# Patient Record
Sex: FEMALE | Race: ASIAN | NOT HISPANIC OR LATINO | Employment: FULL TIME | URBAN - METROPOLITAN AREA
[De-identification: names, ages, dates, MRNs, and addresses within clinical notes are randomized per-mention and may not be internally consistent; named-entity substitution may affect disease eponyms.]

---

## 2017-06-05 ENCOUNTER — COMMUNICATION - HEALTHEAST (OUTPATIENT)
Dept: FAMILY MEDICINE | Facility: CLINIC | Age: 22
End: 2017-06-05

## 2017-06-07 ENCOUNTER — PRENATAL OFFICE VISIT - HEALTHEAST (OUTPATIENT)
Dept: FAMILY MEDICINE | Facility: CLINIC | Age: 22
End: 2017-06-07

## 2017-06-07 DIAGNOSIS — Z32.01 POSITIVE PREGNANCY TEST: ICD-10-CM

## 2017-06-07 DIAGNOSIS — N92.6 ABNORMAL MENSES: ICD-10-CM

## 2017-06-07 DIAGNOSIS — Z34.00 NORMAL PREGNANCY, FIRST: ICD-10-CM

## 2017-06-07 LAB — HIV 1+2 AB+HIV1 P24 AG SERPL QL IA: NEGATIVE

## 2017-06-07 ASSESSMENT — MIFFLIN-ST. JEOR: SCORE: 1359.11

## 2017-06-08 LAB
HBV SURFACE AG SERPL QL IA: NEGATIVE
SYPHILIS RPR SCREEN - HISTORICAL: NORMAL

## 2017-06-09 ENCOUNTER — HOSPITAL ENCOUNTER (OUTPATIENT)
Dept: ULTRASOUND IMAGING | Facility: HOSPITAL | Age: 22
Discharge: HOME | End: 2017-06-09
Attending: PHYSICIAN ASSISTANT

## 2017-06-09 DIAGNOSIS — Z34.00 NORMAL PREGNANCY, FIRST: ICD-10-CM

## 2017-06-09 DIAGNOSIS — Z32.01 POSITIVE PREGNANCY TEST: ICD-10-CM

## 2017-06-15 ENCOUNTER — COMMUNICATION - HEALTHEAST (OUTPATIENT)
Dept: SCHEDULING | Facility: CLINIC | Age: 22
End: 2017-06-15

## 2017-06-30 ENCOUNTER — PRENATAL OFFICE VISIT - HEALTHEAST (OUTPATIENT)
Dept: FAMILY MEDICINE | Facility: CLINIC | Age: 22
End: 2017-06-30

## 2017-06-30 DIAGNOSIS — Z34.91 ENCOUNTER FOR SUPERVISION OF NORMAL PREGNANCY IN FIRST TRIMESTER: ICD-10-CM

## 2017-06-30 ASSESSMENT — MIFFLIN-ST. JEOR: SCORE: 1363.64

## 2017-07-28 ENCOUNTER — PRENATAL OFFICE VISIT - HEALTHEAST (OUTPATIENT)
Dept: FAMILY MEDICINE | Facility: CLINIC | Age: 22
End: 2017-07-28

## 2017-07-28 DIAGNOSIS — Z34.92 ENCOUNTER FOR SUPERVISION OF NORMAL PREGNANCY IN SECOND TRIMESTER: ICD-10-CM

## 2017-08-29 ENCOUNTER — PRENATAL OFFICE VISIT - HEALTHEAST (OUTPATIENT)
Dept: FAMILY MEDICINE | Facility: CLINIC | Age: 22
End: 2017-08-29

## 2017-08-29 DIAGNOSIS — Z34.92 ENCOUNTER FOR SUPERVISION OF NORMAL PREGNANCY IN SECOND TRIMESTER: ICD-10-CM

## 2017-09-29 ENCOUNTER — RECORDS - HEALTHEAST (OUTPATIENT)
Dept: ADMINISTRATIVE | Facility: OTHER | Age: 22
End: 2017-09-29

## 2017-09-29 ENCOUNTER — PRENATAL OFFICE VISIT - HEALTHEAST (OUTPATIENT)
Dept: FAMILY MEDICINE | Facility: CLINIC | Age: 22
End: 2017-09-29

## 2017-09-29 DIAGNOSIS — Z34.92 SUPERVISION OF NORMAL PREGNANCY IN SECOND TRIMESTER: ICD-10-CM

## 2017-09-29 DIAGNOSIS — Z23 NEED FOR PROPHYLACTIC VACCINATION AND INOCULATION AGAINST INFLUENZA: ICD-10-CM

## 2017-09-29 DIAGNOSIS — N76.0 VAGINITIS: ICD-10-CM

## 2017-09-29 DIAGNOSIS — R45.86 MOOD SWINGS: ICD-10-CM

## 2017-10-09 ENCOUNTER — COMMUNICATION - HEALTHEAST (OUTPATIENT)
Dept: FAMILY MEDICINE | Facility: CLINIC | Age: 22
End: 2017-10-09

## 2017-10-17 ENCOUNTER — HOSPITAL ENCOUNTER (OUTPATIENT)
Dept: ULTRASOUND IMAGING | Facility: HOSPITAL | Age: 22
Discharge: HOME | End: 2017-10-17
Attending: FAMILY MEDICINE

## 2017-10-17 DIAGNOSIS — Z34.92 SUPERVISION OF NORMAL PREGNANCY IN SECOND TRIMESTER: ICD-10-CM

## 2017-10-31 ENCOUNTER — PRENATAL OFFICE VISIT - HEALTHEAST (OUTPATIENT)
Dept: FAMILY MEDICINE | Facility: CLINIC | Age: 22
End: 2017-10-31

## 2017-10-31 DIAGNOSIS — B37.9 YEAST INFECTION: ICD-10-CM

## 2017-10-31 DIAGNOSIS — Z34.92 SECOND TRIMESTER PREGNANCY: ICD-10-CM

## 2017-11-01 ENCOUNTER — COMMUNICATION - HEALTHEAST (OUTPATIENT)
Dept: FAMILY MEDICINE | Facility: CLINIC | Age: 22
End: 2017-11-01

## 2017-11-01 LAB — SYPHILIS RPR SCREEN - HISTORICAL: NORMAL

## 2017-11-03 ENCOUNTER — HOSPITAL ENCOUNTER (OUTPATIENT)
Dept: ULTRASOUND IMAGING | Facility: CLINIC | Age: 22
Discharge: HOME | End: 2017-11-03

## 2017-11-03 DIAGNOSIS — Z34.92 SECOND TRIMESTER PREGNANCY: ICD-10-CM

## 2017-11-06 ENCOUNTER — COMMUNICATION - HEALTHEAST (OUTPATIENT)
Dept: FAMILY MEDICINE | Facility: CLINIC | Age: 22
End: 2017-11-06

## 2017-11-28 ENCOUNTER — COMMUNICATION - HEALTHEAST (OUTPATIENT)
Dept: FAMILY MEDICINE | Facility: CLINIC | Age: 22
End: 2017-11-28

## 2017-11-28 ENCOUNTER — PRENATAL OFFICE VISIT - HEALTHEAST (OUTPATIENT)
Dept: FAMILY MEDICINE | Facility: CLINIC | Age: 22
End: 2017-11-28

## 2017-11-28 DIAGNOSIS — Z34.93 THIRD TRIMESTER PREGNANCY: ICD-10-CM

## 2017-12-18 ENCOUNTER — PRENATAL OFFICE VISIT - HEALTHEAST (OUTPATIENT)
Dept: FAMILY MEDICINE | Facility: CLINIC | Age: 22
End: 2017-12-18

## 2017-12-18 DIAGNOSIS — Z34.93 THIRD TRIMESTER PREGNANCY: ICD-10-CM

## 2018-01-02 ENCOUNTER — PRENATAL OFFICE VISIT - HEALTHEAST (OUTPATIENT)
Dept: FAMILY MEDICINE | Facility: CLINIC | Age: 23
End: 2018-01-02

## 2018-01-02 DIAGNOSIS — Z34.93 THIRD TRIMESTER PREGNANCY: ICD-10-CM

## 2018-01-02 LAB
ALBUMIN UR-MCNC: NEGATIVE MG/DL
GLUCOSE UR STRIP-MCNC: NEGATIVE MG/DL
KETONES UR STRIP-MCNC: NEGATIVE MG/DL

## 2018-01-03 LAB
ALLERGIC TO PENICILLIN: NO
GP B STREP DNA SPEC QL NAA+PROBE: POSITIVE

## 2018-01-04 ENCOUNTER — COMMUNICATION - HEALTHEAST (OUTPATIENT)
Dept: FAMILY MEDICINE | Facility: CLINIC | Age: 23
End: 2018-01-04

## 2018-01-09 ENCOUNTER — PRENATAL OFFICE VISIT - HEALTHEAST (OUTPATIENT)
Dept: FAMILY MEDICINE | Facility: CLINIC | Age: 23
End: 2018-01-09

## 2018-01-09 DIAGNOSIS — Z34.03 ENCOUNTER FOR SUPERVISION OF NORMAL FIRST PREGNANCY IN THIRD TRIMESTER: ICD-10-CM

## 2018-01-15 ENCOUNTER — PRENATAL OFFICE VISIT - HEALTHEAST (OUTPATIENT)
Dept: FAMILY MEDICINE | Facility: CLINIC | Age: 23
End: 2018-01-15

## 2018-01-15 DIAGNOSIS — Z34.93 NORMAL PREGNANCY, THIRD TRIMESTER: ICD-10-CM

## 2018-01-23 ENCOUNTER — PRENATAL OFFICE VISIT - HEALTHEAST (OUTPATIENT)
Dept: FAMILY MEDICINE | Facility: CLINIC | Age: 23
End: 2018-01-23

## 2018-01-23 ENCOUNTER — RECORDS - HEALTHEAST (OUTPATIENT)
Dept: ADMINISTRATIVE | Facility: OTHER | Age: 23
End: 2018-01-23

## 2018-01-23 DIAGNOSIS — Z34.93 THIRD TRIMESTER PREGNANCY: ICD-10-CM

## 2018-01-26 ENCOUNTER — HOSPITAL ENCOUNTER (OUTPATIENT)
Dept: ULTRASOUND IMAGING | Facility: HOSPITAL | Age: 23
Discharge: HOME | End: 2018-01-26
Attending: FAMILY MEDICINE

## 2018-01-29 ENCOUNTER — PRENATAL OFFICE VISIT - HEALTHEAST (OUTPATIENT)
Dept: FAMILY MEDICINE | Facility: CLINIC | Age: 23
End: 2018-01-29

## 2018-01-29 DIAGNOSIS — Z34.93 ENCOUNTER FOR SUPERVISION OF NORMAL PREGNANCY IN THIRD TRIMESTER: ICD-10-CM

## 2018-01-29 DIAGNOSIS — N89.8 VAGINAL DISCHARGE: ICD-10-CM

## 2018-01-29 LAB
ALBUMIN UR-MCNC: NEGATIVE MG/DL
CLUE CELLS: NORMAL
GLUCOSE UR STRIP-MCNC: NEGATIVE MG/DL
KETONES UR STRIP-MCNC: NEGATIVE MG/DL
TRICHOMONAS, WET PREP: NORMAL
YEAST, WET PREP: NORMAL

## 2018-01-30 ENCOUNTER — ANESTHESIA - HEALTHEAST (OUTPATIENT)
Dept: OBGYN | Facility: HOSPITAL | Age: 23
End: 2018-01-30

## 2018-01-30 ENCOUNTER — COMMUNICATION - HEALTHEAST (OUTPATIENT)
Dept: OBGYN | Facility: HOSPITAL | Age: 23
End: 2018-01-30

## 2018-02-01 ENCOUNTER — HOME CARE/HOSPICE - HEALTHEAST (OUTPATIENT)
Dept: HOME HEALTH SERVICES | Facility: HOME HEALTH | Age: 23
End: 2018-02-01

## 2018-02-02 ENCOUNTER — HOME CARE/HOSPICE - HEALTHEAST (OUTPATIENT)
Dept: HOME HEALTH SERVICES | Facility: HOME HEALTH | Age: 23
End: 2018-02-02

## 2018-02-06 ENCOUNTER — COMMUNICATION - HEALTHEAST (OUTPATIENT)
Dept: FAMILY MEDICINE | Facility: CLINIC | Age: 23
End: 2018-02-06

## 2018-02-06 DIAGNOSIS — Z78.9 BREASTFEEDING (INFANT): ICD-10-CM

## 2018-02-06 DIAGNOSIS — N64.4 NIPPLE SORENESS: ICD-10-CM

## 2018-02-19 ENCOUNTER — COMMUNICATION - HEALTHEAST (OUTPATIENT)
Dept: FAMILY MEDICINE | Facility: CLINIC | Age: 23
End: 2018-02-19

## 2018-03-12 ENCOUNTER — OFFICE VISIT - HEALTHEAST (OUTPATIENT)
Dept: FAMILY MEDICINE | Facility: CLINIC | Age: 23
End: 2018-03-12

## 2018-03-12 DIAGNOSIS — Z30.017 NEXPLANON INSERTION: ICD-10-CM

## 2018-03-12 LAB
HCG UR QL: NEGATIVE
SP GR UR STRIP: <=1.005 (ref 1–1.03)

## 2018-03-12 ASSESSMENT — MIFFLIN-ST. JEOR: SCORE: 1390.86

## 2018-04-11 ENCOUNTER — COMMUNICATION - HEALTHEAST (OUTPATIENT)
Dept: OBGYN | Facility: HOSPITAL | Age: 23
End: 2018-04-11

## 2019-03-18 ENCOUNTER — TELEPHONE (OUTPATIENT)
Dept: OTHER | Facility: CLINIC | Age: 24
End: 2019-03-18

## 2019-10-04 ENCOUNTER — OFFICE VISIT - HEALTHEAST (OUTPATIENT)
Dept: FAMILY MEDICINE | Facility: CLINIC | Age: 24
End: 2019-10-04

## 2019-10-04 DIAGNOSIS — Z71.85 IMMUNIZATION COUNSELING: ICD-10-CM

## 2019-10-04 DIAGNOSIS — H00.019 HORDEOLUM EXTERNUM, UNSPECIFIED LATERALITY: ICD-10-CM

## 2019-10-04 ASSESSMENT — MIFFLIN-ST. JEOR: SCORE: 1399.93

## 2021-03-17 ENCOUNTER — OFFICE VISIT - HEALTHEAST (OUTPATIENT)
Dept: FAMILY MEDICINE | Facility: CLINIC | Age: 26
End: 2021-03-17

## 2021-03-17 DIAGNOSIS — Z11.59 ENCOUNTER FOR HEPATITIS C SCREENING TEST FOR LOW RISK PATIENT: ICD-10-CM

## 2021-03-17 DIAGNOSIS — Z30.46 ENCOUNTER FOR NEXPLANON REMOVAL: ICD-10-CM

## 2021-03-17 DIAGNOSIS — Z00.00 ANNUAL PHYSICAL EXAM: ICD-10-CM

## 2021-03-17 LAB
CHOLEST SERPL-MCNC: 206 MG/DL
FASTING STATUS PATIENT QL REPORTED: YES
FASTING STATUS PATIENT QL REPORTED: YES
GLUCOSE BLD-MCNC: 91 MG/DL (ref 70–99)
HDLC SERPL-MCNC: 60 MG/DL
LDLC SERPL CALC-MCNC: 138 MG/DL
TRIGL SERPL-MCNC: 41 MG/DL

## 2021-03-17 ASSESSMENT — MIFFLIN-ST. JEOR: SCORE: 1493.71

## 2021-03-18 LAB — HCV AB SERPL QL IA: NEGATIVE

## 2021-05-31 VITALS — BODY MASS INDEX: 24.24 KG/M2 | WEIGHT: 142 LBS | HEIGHT: 64 IN

## 2021-05-31 VITALS — BODY MASS INDEX: 28.49 KG/M2 | WEIGHT: 166 LBS

## 2021-05-31 VITALS — BODY MASS INDEX: 28.44 KG/M2 | WEIGHT: 165.7 LBS

## 2021-05-31 VITALS — WEIGHT: 166.4 LBS | BODY MASS INDEX: 28.56 KG/M2

## 2021-05-31 VITALS — WEIGHT: 148 LBS | BODY MASS INDEX: 25.4 KG/M2

## 2021-05-31 VITALS — WEIGHT: 167.44 LBS | BODY MASS INDEX: 28.74 KG/M2

## 2021-05-31 VITALS — BODY MASS INDEX: 24.41 KG/M2 | WEIGHT: 143 LBS | HEIGHT: 64 IN

## 2021-05-31 VITALS — WEIGHT: 137.5 LBS | BODY MASS INDEX: 23.6 KG/M2

## 2021-05-31 VITALS — BODY MASS INDEX: 28.13 KG/M2 | WEIGHT: 163.9 LBS

## 2021-05-31 VITALS — BODY MASS INDEX: 24.03 KG/M2 | WEIGHT: 140 LBS

## 2021-05-31 VITALS — WEIGHT: 157 LBS | BODY MASS INDEX: 26.95 KG/M2

## 2021-05-31 VITALS — WEIGHT: 154.2 LBS | BODY MASS INDEX: 26.47 KG/M2

## 2021-06-01 VITALS — HEIGHT: 64 IN | WEIGHT: 149 LBS | BODY MASS INDEX: 25.44 KG/M2

## 2021-06-01 VITALS — BODY MASS INDEX: 28.87 KG/M2 | WEIGHT: 168.19 LBS

## 2021-06-02 NOTE — PROGRESS NOTES
"Subjective: Patient comes in for evaluation of right upper lid.  Patient has had some styes somewhat recurrent.    We discussed treatment options she is not really been doing anything    Overnight use some erythromycin ointment is warm packs.  If not improved see ophthalmology    She also needed a flu shot today    The inflammation is come down but she said the recurrence in the right upper lid.        No additional concerns or issues.    Tobacco status: She  reports that she has never smoked. She has never used smokeless tobacco.    Patient Active Problem List    Diagnosis Date Noted     Pregnant 2018      (normal spontaneous vaginal delivery)      Yeast infection 10/31/2017     Rubella non-immune status, antepartum 2017     Normal pregnancy, first 2017       Current Outpatient Medications   Medication Sig Dispense Refill     docusate sodium (COLACE) 100 MG capsule Take 1 capsule (100 mg total) by mouth daily. 30 capsule 0     erythromycin ophthalmic ointment Apply two times a day to affected eye 3.5 g 0     ibuprofen (ADVIL,MOTRIN) 800 MG tablet Take 1 tablet (800 mg total) by mouth every 8 (eight) hours. 60 tablet 0     nipple ointment all purpose - [facility] Apply topically as needed for irritation. Apply to nipples sparingly after each feeding. Do not wash or wipe off. 30.6 g 0     No current facility-administered medications for this visit.        ROS:   10 point review of systems positive as outlined above otherwise negative    Objective:    BP 90/52 (Patient Site: Right Arm, Patient Position: Sitting, Cuff Size: Adult Regular)   Pulse 72   Resp 16   Ht 5' 4\" (1.626 m)   Wt 151 lb (68.5 kg)   LMP 2019 (Within Months)   BMI 25.92 kg/m    Body mass index is 25.92 kg/m .      General appearance no acute distress    Pupils react normally extract movements full right upper lid with some mild hordeolum change    No drainage    Oropharynx is clear    Lungs are clear no " rales or rhonchi heart was regular S1-S2        Assessment:  1. Hordeolum externum, unspecified laterality  erythromycin ophthalmic ointment   2. Immunization counseling  Influenza, Seasonal Quad, PF =/> 6months     With warm packs and erythromycin ointment once a day.    Flu shot    If not improved see ophthalmology    Plan: Above    This transcription uses voice recognition software, which may contain typographical errors.

## 2021-06-03 VITALS
BODY MASS INDEX: 25.78 KG/M2 | WEIGHT: 151 LBS | SYSTOLIC BLOOD PRESSURE: 90 MMHG | HEIGHT: 64 IN | HEART RATE: 72 BPM | RESPIRATION RATE: 16 BRPM | DIASTOLIC BLOOD PRESSURE: 52 MMHG

## 2021-06-05 VITALS
HEIGHT: 65 IN | BODY MASS INDEX: 28.16 KG/M2 | DIASTOLIC BLOOD PRESSURE: 61 MMHG | RESPIRATION RATE: 14 BRPM | WEIGHT: 169 LBS | HEART RATE: 68 BPM | SYSTOLIC BLOOD PRESSURE: 101 MMHG | TEMPERATURE: 97.7 F

## 2021-06-11 NOTE — PROGRESS NOTES
Chief Complaint:  Chief Complaint   Patient presents with     Pregnancy Intake     #1, LMP: Mid Apri 2017     HPI:   Lizbeth Ramos is a 22 y.o. female is here for pregnancy intake.  She is .  Patient's last menstrual period was 04/15/2017 (approximate).  Positive home pregnancy test 6/3/17.  Mild allergy symptoms.  Her sister has some hearing loss since birth, has a hearing aid.  Her 's sister has Cerebral Palsy and some Mental Retardation.    Past medical history: reviewed and updated.  No past medical history on file.  Past Surgical History:   Procedure Laterality Date     NO PAST SURGERIES         Current Outpatient Prescriptions:      acetaminophen (TYLENOL) 500 MG tablet, Take 500 mg by mouth every 6 (six) hours as needed for pain., Disp: , Rfl:      loratadine (CLARITIN) 10 mg tablet, Take 1 tablet (10 mg total) by mouth daily., Disp: 30 tablet, Rfl: 2     prenatal vit-iron fum-folic ac (PRENATAL VITAMIN) 27 mg iron- 0.8 mg Tab, Take 1 tablet by mouth once daily., Disp: 100 tablet, Rfl: 3    Social:  Social History   Substance Use Topics     Smoking status: Never Smoker     Smokeless tobacco: Never Used     Alcohol use Yes      Comment: occasional       OBJECTIVE:  No Known Allergies  Vitals:    17 1324   BP: 100/64   Pulse: 76   Resp: 16     Body mass index is 24.37 kg/(m^2).    Vital signs stable as recorded above  General: Patient is alert and oriented x 3, in no apparent distress  Physical Exam deferred to patient's First OB Visit.    Results:  Results for orders placed or performed in visit on 17   Culture, Urine   Result Value Ref Range    Culture No Growth    Pregnancy, Urine   Result Value Ref Range    Pregnancy Test, Urine Positive (!) Negative   ABO/RH Typing (OP order)   Result Value Ref Range    HML ABO/Rh Typing B POS     HML ABO/Rh Repeat Typing B POS    Hepatitis B Surface antigen (HBsAG)   Result Value Ref Range    Hepatitis B Surface Ag Negative Negative   HIV  Antigen/Antibody Screening Cascade   Result Value Ref Range    HIV Antigen / Antibody Negative Negative   HML Antibody Screen   Result Value Ref Range    HML Antibody Screen Negative Negative   RPR   Result Value Ref Range    Syphilis Screen Cascade Non-Reactive Non-Reactive   Rubella Immune Status (IgG)   Result Value Ref Range    Rubella IgG Non - Immune (!) Immune   Lead, Blood   Result Value Ref Range    Lead  <5.0 ug/dL    Collection Method Venous    Drugs of Abuse 1,Urine   Result Value Ref Range    Amphetamines Screen Negative Screen Negative    Benzodiazepines Screen Negative Screen Negative    Opiates Screen Negative Screen Negative    Phencyclidine Screen Negative Screen Negative    THC Screen Negative Screen Negative    Barbiturates Screen Negative Screen Negative    Cocaine Metabolite Screen Negative Screen Negative    Oxycodone Screen Negative Screen Negative    Creatinine, Urine 35.7 mg/dL   Urinalysis, OB Screen   Result Value Ref Range    Glucose, UA Negative Negative    Ketones, UA Negative Negative    Protein, UA Negative Negative mg/dL   HM1 (CBC with Diff)   Result Value Ref Range    WBC 9.2 4.0 - 11.0 thou/uL    RBC 4.12 3.80 - 5.40 mill/uL    Hemoglobin 12.9 12.0 - 16.0 g/dL    Hematocrit 39.3 35.0 - 47.0 %    MCV 95 80 - 100 fL    MCH 31.3 27.0 - 34.0 pg    MCHC 32.8 32.0 - 36.0 g/dL    RDW 12.1 11.0 - 14.5 %    Platelets 170 140 - 440 thou/uL    MPV 11.6 8.5 - 12.5 fL    Neutrophils % 72 (H) 50 - 70 %    Lymphocytes % 17 (L) 20 - 40 %    Monocytes % 10 2 - 10 %    Eosinophils % 1 0 - 6 %    Basophils % 1 0 - 2 %    Neutrophils Absolute 6.6 2.0 - 7.7 thou/uL    Lymphocytes Absolute 1.5 0.8 - 4.4 thou/uL    Monocytes Absolute 0.9 0.0 - 0.9 thou/uL    Eosinophils Absolute 0.1 0.0 - 0.4 thou/uL    Basophils Absolute 0.1 0.0 - 0.2 thou/uL     Other screening pregnancy lab work is ordered and pending.    Patient scored a 7/30 on [place]  Depression Screen.    Assessment and Plan:  1.  Pregnancy Intake Appointment.  Lizbeth Ramos is 22 y.o. and .  Patient's last menstrual period was 04/15/2017 (approximate).  Estimated Date of Delivery: 18  She will be seeing Dr. Palumbo for OB care.  She will be seeing Dr. Robb until Dr. Palumbo starts in September.  Screening pregnancy lab work was drawn.  Prenatal vitamins prescribed.  Problem list and current medications reviewed and updated.  Dating ultrasound ordered.  Prenatal info packet given.  First trimester screening and genetic counseling were reviewed.  She will contact us if interested.    Follow up in 4 weeks.  Please see OB Episode for complete details.  Visit was approximately 35 minutes, greater than 50% of time spent in face-to-face counseling and coordination of care.    This dictation uses voice recognition software, which may contain typographical errors.

## 2021-06-11 NOTE — PROGRESS NOTES
First OB.   Feeling well though somewhat tired.   She wanted to check her hemoglobin, this was done 6/7/17 and normal.   Last TSH was normal.   Discussed vitamin D supplementation, she will start.   No pelvic pain, bleeding, dysuria.   Chlamydia, gonorrhea collected today.   Plans to deliver at [facility].   Plans to work until delivery.

## 2021-06-12 NOTE — PROGRESS NOTES
Feeling well.   No bleeding. No lof. No dysuria. No pelvic pain.   Discussed quad screen, will consider for next visit.

## 2021-06-12 NOTE — PROGRESS NOTES
Feeling well though had some loose stool over the weekend, attributed to viral gastroenteritis. If feeling better now.   No pelvic pain or bleeding. No FM yet.   Quad screen today.   Will schedule with Dr. Palumbo for next OB visit and remaining care.

## 2021-06-13 NOTE — PROGRESS NOTES
Please call patient and inform:  Glucose tolerance test was negative.  No signs of gestational diabetes.  All other labs normal except for some mild anemia.  Hemoglobin 11.4, no additional therapy needed just continue your prenatal vitamin and increase iron rich foods.

## 2021-06-13 NOTE — PROGRESS NOTES
Does not feel much FM yet.   No bleeding, lof, contractions, pelvic pain, but notes vaginal itching for the last two weeks. It is somewhat better. Has used vagisil, a cream with some relief.   She notes worsening mood swings. She wonders if this is due to pregnancy. She reports some changes in life, for example she moved in with FOB in August. That has been some adjustment. She reports weekly 'mood swings' that seem to be worsening.   Denies depression, anxiety, but some similar changes when in school. She denies suicidal or homicidal ideations. She agrees to seek counseling. This was arranged.   She would like to check for a yeast infection. Has had this before.   Wet prep collected. : Some erythema over vaginal mucosa. Mild white discharge. Wet prep pending. Glucose noted in urine.   Fetal survey scheduled.   Influenza vaccine today.   Will see Dr. Palumbo starting next visit in four weeks.

## 2021-06-13 NOTE — PROGRESS NOTES
No ctx, lof, bleeding or pelvic pain.  No HA or vision changes.  Still having some irritation, itching and white vaginal discharge.  Did take the Diflucan ×1 and it did not help.  Discussed different options and she would like to try the cream.  Patient is overall doing well except some lower back pain.  has to find different positions during the night to sleep.  Good FM : yes    Labs/imaging reviewed: Needs repeat ultrasound as spine not fully evaluated.    Plan today:   Clotriamzolel cream 1% ×7 nights  Tdap today  Follow-up OB limited ultrasound for spine  1 hour GTT and 28 week labs  Discussed prenatal yoga.  Follow-up in 4 weeks    Gloria Palumbo

## 2021-06-14 NOTE — PROGRESS NOTES
No ctx, lof, bleeding, or discharge.  No HA or vision changes.  [place] contractions.  Good FM: Yes  Patient states she is feeling well and has no concerns.    Plan today:   Reviewed postpartum birth control options and handout given.  Patient is thinking she would like Nexplanon placed at the 6 week postpartum visit.  She is planning to breast-feed and we discussed lactation consultant services.  She will plan on bringing her baby here to be seen.  Discussed GBS swab at next visit and expectant management.  Follow-up 2 weeks    Gloria Palumbo

## 2021-06-14 NOTE — PROGRESS NOTES
No ctx, lof, bleeding, or discharge.  No HA or vision changes.  Good FM: Yes!  She is wondering what contractions feel like.  She has been feeling a little bit of pelvic tightness that occurs for short period time a few times a day.  She is wondering if this is contractions.  She was also wondering about the follow-up ultrasound.  She is also wondering about a .    Labs/imaging reviewed: Follow-up ultrasound of the spine was normal.    Plan today:   Reviewed signs and symptoms of labor.  Reviewed pain control options in labor.  Reviewed going to [facility] for delivery.  Has been there before as her mother has some younger children from her second marriage delivered at [facility].  Follow-up in 3 weeks.  Will talk to  about  referrals.    Gloria Palumbo

## 2021-06-15 NOTE — PROGRESS NOTES
No ctx, lof, bleeding, or discharge.  No HA or vision changes.  Good FM : yes.   Having ginny hogan and feeling more pressure.       Plan today:   SVE ft/50/-2/mid/med. A little change in effacement.   Growth has been stable at 37cm for 3 weeks.  Suspect baby is coming down the pelvis but does not feel like that much on exam.  Was 25% EFW and 20 week anatomy ultrasound.  Previously growing above dates and now falling below.  Will obtain growth ultrasound to evaluate today.  Discussed my vacation days this weekend and on call back up.  Follow-up in 1 week.      Gloria Palumbo

## 2021-06-15 NOTE — PROGRESS NOTES
No ctx, lof, bleeding, or discharge.  No HA or vision changes.  Good FM : yes  No questions or concerns today. Nothing new.     Plan today:   Discussed rubella nonimmune status and need for MMR after delivery  Discussed expectant stay at the hospital and lactation consultants  Answered all questions to patient satisfaction.  Follow-up in 1 week.    Gloria Palumbo

## 2021-06-15 NOTE — PROGRESS NOTES
Please call patient and inform:  Positive for GBS. Will need antibiotics during labor. Can discuss again in more detail at next visit.

## 2021-06-15 NOTE — PROGRESS NOTES
Please call patient and inform:  No signs of yeast or bacterial vaginosis on swab we obtained.  I am suspecting discharge is normal discharge of pregnancy or possibly part of your mucus plug especially with the amount of change you made on your cervical exam!

## 2021-06-15 NOTE — PROGRESS NOTES
No ctx, lof, bleeding, or discharge.  No HA or vision changes.  Good FM : yes!  Has some sensitivity of her skin in the upper part of where her uterus is. No itching. A little pain/sensitive. Has been applying lotion and this is helping.  Exam: no stretch marks noted.     Labs/imaging reviewed: GBS +    Plan today:   25 lb weight gain  GBS +, discussed expectant management with antibiotics in labor.  Measuring well for dates.  Baby feels head down.  SVE fingertip/20/-3/posterior  Questions answered to patient and boyfriend satisfaction.  Discussed signs and symptoms of labor.  Follow-up in 1 week.    Gloria Palumbo

## 2021-06-15 NOTE — PROGRESS NOTES
Patient endorses a whitish to yellow tinge vaginal discharge off and on for the last few weeks now.  Forgot to mention something.  Not sticky.  Has a bad odor.  Not irritating or itchy.  Denies any gush of fluid, leaking or vaginal discharge.  Endorses some more [place] contractions that are lasting longer.  Also endorses some intermittent headaches and some vision changes wondering if she needs a new eyeglass prescription.  Good FM: Yes    Labs/imaging reviewed: Recent ultrasound showing appropriate growth following along growth curve within 2 standard deviations.  29% EFW.    Plan today:  Speculum  exam performed: Noted white creamy discharge.  Wet mount obtained  SVE: 2/70/-2.  Decent amount change since I last saw her.  Membrane stripped today.  Reviewed s/s of preeclampsia and when to call or return.   Answered all questions to patient satisfaction.  Follow-up in 1 week.    Gloria Palumbo

## 2021-06-15 NOTE — PROGRESS NOTES
No ctx, lof, bleeding, or discharge.  No HA or vision changes. Felt a little dehydrated this week. Her and BF Jamal have been sick with colds.   Good FM : yes.   No questions or concerns today.     Labs/imaging reviewed: due for GBS today    Plan today:   GBS swab obtained.   Measuring at 35-36cm today. A little more than last time. Will monitor closely- appropriate for dates still. Gained 2lbs since last visit.   F/u in 1 week.     Gloria Palumbo

## 2021-06-16 PROBLEM — B37.9 YEAST INFECTION: Status: ACTIVE | Noted: 2017-10-31

## 2021-06-16 PROBLEM — Z28.39 RUBELLA NON-IMMUNE STATUS, ANTEPARTUM: Status: ACTIVE | Noted: 2017-06-09

## 2021-06-16 PROBLEM — Z34.90 PREGNANT: Status: ACTIVE | Noted: 2018-01-30

## 2021-06-16 PROBLEM — O09.899 RUBELLA NON-IMMUNE STATUS, ANTEPARTUM: Status: ACTIVE | Noted: 2017-06-09

## 2021-06-16 PROBLEM — Z34.00 NORMAL PREGNANCY, FIRST: Status: ACTIVE | Noted: 2017-06-07

## 2021-06-16 NOTE — PROGRESS NOTES
ANNUAL       Chief Complaint   Patient presents with     Annual Exam     nexplanon removal       HISTORY OF PRESENT ILLNESS:  Pt is a 26 y.o.     alert female who presents today for an annual exam.    Concerns today: Desires nexplanon removal. Biometric screening for work    Contraception: things happening to my body. Over the 3 years. When I exercise  my body gets really warm. Break out in rashes and get itchy. Go outside if chilly. Hives. And wasn't itchy.   A day or two without shower and scalp itchy  1st year- some emotional issues after having Eleby - readjust?  Menses q 3 months. When it does flow. really high emotionally Vs monthly cycle.   They think they would like to start trying to get pregnant later this year so have decided to take nexplanon out today and start to track cycles. See if symptoms above improve after nexplanon removal. Is this the cause or something else?- she will let me know if symptoms do not improve and they are bothering her.    Std screening: declines    Healthy Habits:   Dental Visits Regularly: yes- last 1 year ago  Seat Belt: yes  Sexually active: yes    No LMP recorded. Patient has had an implant.    No Known Allergies    No current outpatient medications on file prior to visit.     No current facility-administered medications on file prior to visit.        No past medical history on file.    Past Surgical History:   Procedure Laterality Date     NO PAST SURGERIES         Social History     Socioeconomic History     Marital status:      Spouse name: Not on file     Number of children: Not on file     Years of education: Not on file     Highest education level: Not on file   Occupational History     Occupation: student     Comment: masters in counseling     Occupation: career center work   Social Needs     Financial resource strain: Not on file     Food insecurity     Worry: Not on file     Inability: Not on file     Transportation needs     Medical: Not on file      Non-medical: Not on file   Tobacco Use     Smoking status: Never Smoker     Smokeless tobacco: Never Used   Substance and Sexual Activity     Alcohol use: No     Drug use: No     Sexual activity: Yes     Partners: Male     Birth control/protection: None   Lifestyle     Physical activity     Days per week: Not on file     Minutes per session: Not on file     Stress: Not on file   Relationships     Social connections     Talks on phone: Not on file     Gets together: Not on file     Attends Denominational service: Not on file     Active member of club or organization: Not on file     Attends meetings of clubs or organizations: Not on file     Relationship status: Not on file     Intimate partner violence     Fear of current or ex partner: Not on file     Emotionally abused: Not on file     Physically abused: Not on file     Forced sexual activity: Not on file   Other Topics Concern     Not on file   Social History Narrative     Not on file       Family History   Problem Relation Age of Onset     Diabetes type II Mother      Stroke Father      Hearing loss Sister         from birth, has hearing aid     No Medical Problems Brother        OB History        1    Para   1    Term   1       0    AB   0    Living   1       SAB   0    TAB   0    Ectopic   0    Multiple   0    Live Births   1                 GYNECOLOGIC HISTORY:  Currently menses q 3 months and normal flow with nexplanon Lizbeth BRYAN Phillipse has no history of STDs.  Lizbeth BRYAN Phillipse has no history of abnormal Pap smears.   Last pap result was normal 2016.    REVIEW OF SYSTEMS:    Constitutional:  Denies Headache.  No weight changes.  No fevers or chills.    HEENT:  Denies vision changes or hearing changes. No sinus problems.  Breasts:  Denies breast masses, pain or nipple discharge.    Respiratory:  No breathing issues, cough or shortness of breath  Cardiovascular:  Denies chest pain, syncope or palpitations.    GI:  Denies nausea, vomiting, diarrhea, or  "constipation  Endocrine:  Denies hot flashes, night sweats, heat or cold intolerance.  Hematologic:  Denies easy bruising or bleeding disorders.  Allergies/Immunologic:  Denies seasonal allergies or any history of immunologic disorders  Neurologic:  Normal sensation and motor control.  No history of seizures or syncope.  Musculoskeletal:  Denies joint pain, swelling, or erythema  Skin:  + see HPI  Psychiatric:  Denies anxiety, depression, memory deficits, and appetite or sleep changes. Mood changes with menses    PHYSICAL EXAM  /61 (Patient Site: Left Arm, Patient Position: Sitting, Cuff Size: Adult Regular)   Pulse 68   Temp 97.7  F (36.5  C) (Temporal)   Resp 14   Ht 5' 4.76\" (1.645 m)   Wt 169 lb (76.7 kg)   BMI 28.33 kg/m    GENERAL APPEARANCE:  The patient is a pleasant, normal appearing female with normal affect and in no distress.  HEENT: Normocephalic atraumatic.  PERRL, ocular muscles intact.  No conjunctivitis or icterus noticed.  TMs clear with good cone of light reflex.  Oropharynx clear and moist mucous membranes.  NECK: supple.  No cervical lymphadenopathy.  No thyromegaly, no nodules palpated, trachea midline.  LUNGS: Clear bilaterally with normal respiratory effort  HEART:   Regular rate and rhythm.  No murmurs noted.  Pulses are full and symmetrical.  BREASTS: Breast exam performed seated and supine.  No masses, non-tender, no nipple discharge or lymphadenopathy.  ABDOMEN: Soft, non-tender, non-distended.  No hepatosplenomegaly.  Normal bowel sounds.  No umbilical or inguinal hernias.  SKIN:  Warm and dry to touch.  No lesions or rashes noted.    PSYCHIATRIC/NEUROLOGIC:  Appropriate mood and affect, normal recall, alert and oriented x 3  EXTREMITIES:  Warm and well perfused.  No edema noted.  Muscle strength and sensation are normal bilaterally 5/5 in both upper and lower extremities.   :  Vulva: Inspection of her external genitalia reveals normal mons pubis, labia minora and labia " majora.  Normal appearing clitoris, urethral meatus and Buena's glands.    Bladder:  No evidence of urethral or bladder tenderness.    Vagina:  Speculum exam reveals pink and moist vaginal mucosa.  Bartholin gland is normal to palpation.  Cervix:  Cervix is normal in appearance with no lesions.  There is no cervical motion tenderness.  Uterus:  Uterus is normal size, mobile and non-tender.  No adnexal masses are palpated.  Adnexa are non-tender to palpation  Perineum:  Perineum appears normal.  Anus:  Normal with no apparent lesions.         PHQ9:   Little interest or pleasure in doing things: Not at all  Feeling down, depressed, or hopeless: Not at all      Lizbeth was seen today for annual exam.    Diagnoses and all orders for this visit:    Annual physical exam:   Self breast exam risks/benefits reviewed  Safe sex practices reviewed with patient  Contraception reviewed and desires pregnancy later this year. nexplanon out today and will use condoms until ready to conceive   HPV testing dicussed and new Pap smear recommendations reviewed with patient  Biometric screening for work- labs per below  -     Gynecologic Cytology (PAP Smear)  -     Lipid Cascade FASTING  -     Glucose    Encounter for hepatitis C screening test for low risk patient  -     Hepatitis C Antibody (Anti-HCV)    Encounter for Nexplanon removal:   Procedure:  Verbal and written consent was obtained.     Left upper inner arm was adequately anesthetized with 1.5 cc of lidocaine with Epi.  Then, using sterile technique, 5 mm incision was made with an 11 blade.  Nexplanon was palpated subcutaneously and removed with a hemostat clamp.  Incision site was closed with steri-strips and wrapped with a pressure bandage.  Patient was neurovascularly intact after exam.  Proper wound aftercare was dicussed with patient.      Gloria Palumbo

## 2021-06-16 NOTE — PROGRESS NOTES
"Chief Complaint:  Chief Complaint   Patient presents with     Contraception     Postpartum Care       Postpartum Visit  Patient is here for a postpartum visit. She is 6 weeks postpartum following a spontaneous vaginal delivery. I have fully reviewed the prenatal and intrapartum course. The delivery was at 39wk gestational weeks. Outcome: spontaneous vaginal delivery. Anesthesia: none.   Gender female.     Postpartum course has been uncomplicated.  Baby's course has been uncomplicated. Baby is feeding by both breast and bottle - She is only able to produce 3oz a day so is supplementing, may stop now that she is back to work, difficult to keep up with. Bleeding staining now.  Postpartum bleeding for 4.5 weeks.  Bowel function is normal. Bladder function is normal. Patient is sexually active.  Next baby not for awhile.   Contraception method considering is Nexplanon and she would like this placed today.. Postpartum depression screening: negative. WIC :yes.  Exercise yes.  Total Weight Gain during pregnancy 25lb. and Weight loss so far 19lb!.  Return to work/school:returned last week.    Following concerns include:would like nexplanon today. Wondering how long she should have spotting for.    ROS:  10 point review of symptoms all negative except as outlined in the HPI above.    Med list, active problem list, PMH, Surgical Hx, Family Hx reviewed and updated as part of this encounter    Physical Exam:  BP 92/62  Pulse 91  Temp 97.5  F (36.4  C) (Oral)   Resp 20  Ht 5' 4\" (1.626 m)  Wt 149 lb (67.6 kg)  LMP 04/15/2017 (Approximate) Comment: Mid April 2017  SpO2 98%  BMI 25.58 kg/m2 Body mass index is 25.58 kg/(m^2).  Vital signs reviewed    Wt Readings from Last 3 Encounters:   03/12/18 149 lb (67.6 kg)   01/30/18 168 lb (76.2 kg)   01/29/18 168 lb 3 oz (76.3 kg)       EPDS Score: 5    Physical Exam:  All normal as below except abnormalities include: none  General is a  22 y.o. female sitting comfortably in no " apparent distress.   HEENT:  TM are clear bilaterally.  Eye, nasal, oral exams within normal   Neck: Supple without lymphadenopathy or thyromegally  CV: Regular rate and rhythm S1S2 without rubs, murmurs or gallops,   Lungs: Clear to auscultation bilaterally  Abd:  +BS, soft NT/ND,  No masses or organomegally,   Extremities: Warm, No Edema, 2+ Pedal and radial pulses bilaterally  Skin: No lesions or rashes noted  Neuro: Able to ambulate around the exam room with equal movement, strength and normal coordination of the upper and lower extremeties symmetrically  Pelvic:Normally developed genitalia with no external lesions or eruptions. Vagina and cervix show no lesions, inflammation, discharge or tenderness. No cystocele, No rectocele. Uterus :normal size.  No adnexal mass or tenderness.      Results for orders placed or performed in visit on 18   Pregnancy, Urine   Result Value Ref Range    Pregnancy Test, Urine Negative Negative    Specific Gravity, UA <=1.005 1.001 - 1.030     Procedure Note:  Pt is a  22 y.o.  alert female who presents for Nexplanon insertion today. Reviewed with patient all contraceptive options including:  OCP, Ortho Evra, Nuvaring, Depo Provera, IUD-both Mirena and ParaGard, nexplanon, condoms and diaphragm.  After hearing all of her options, the patient chose Nexplanon.  We discussed risks and benefits of Implanon  including:   the possible side effect of metrorrhagia with irregular spotting or bleeding within the first 3-6 months after insertion as well as the 20% risk of amenorrhea.  Other minor side effects were discussed including:  headache (16 percent), weight gain (12 percent), acne (12 percent), breast tenderness (10 percent), emotional lability (6 percent) and abdominal pain (5 percent), pain at insertion site.  Informed consent was obtained.  Pregnancy test was negative.      PROCEDURE:   A point approximately 8 cm from the medial epicondyle on the upper inner left arm  was identified and cleaned with alcohol. This was injected with 2.5cc of 1% Lidocaine with epi along the planned insertion canal. The site was then prepped with Betadyne. The Nexplanon insertion needle was removed from the sterile pack.  The Nexplanon was then inserted just underneath the surface of the skin in the recommended fashion.  Following removal of the insertion needle, the Nexplanon implant was palpated by both the patient and myself.   The insertion site was then covered with an adhesive bandage and then covered with a pressure bandage.  There were no complications and the patient tolerated the procedure well.        Assessment/Plan:  Discussed return to work/school plans.  Postpartum depression assessment and coping techniques. Fatigue and sleep strategies.  Breastfeeding and pumping as needed. Body changes and exercise/weight loss techniques.  Timing of next pregnancy and birth control prescribed as below.  Next pap smear due 2019.      1. Contraception  - Pregnancy, Urine: Negative    2. Nexplanon insertion  The paperwork was filled out and the user care was given to the patient.    The patient was advised to call if she experienced any significant pain, redness or swelling at the incision site or over the alison.    Follow up prn Nexplanon check.  Patient to leave pressure dressing in place for 24h.  Patient to leave adhesive bandage in place for 3 days.  Bleeding precautions given, patient is to call for any heavy bleeding > 1 pad per hour, severe pain or fevers.  - etonogestrel (NEXPLANON) 68 mg Impl implant; 1 each by Subdermal route once for 1 dose.  Dispense: 1 each; Refill: 0  - lidocaine 1%-EPINEPHrine 1:100,000 1 %-1:100,000 injection 2.5 mL (XYLOCAINE W/EPI); 2.5 mL by Other route once.      Gloria Palumbo

## 2021-06-20 ENCOUNTER — HEALTH MAINTENANCE LETTER (OUTPATIENT)
Age: 26
End: 2021-06-20

## 2021-07-04 NOTE — ADDENDUM NOTE
Addendum Note by Hina Escalera MA at 3/17/2021 10:00 AM     Author: Hina Escalera MA Service: -- Author Type: Medical Assistant    Filed: 3/18/2021  8:25 AM Encounter Date: 3/17/2021 Status: Signed    : Hina Escalera MA (Medical Assistant)    Addended by: HINA ESCALERA on: 3/18/2021 08:25 AM        Modules accepted: Orders

## 2021-07-04 NOTE — ADDENDUM NOTE
Addendum Note by Gloria Palumbo DO at 3/17/2021 10:00 AM     Author: Gloria Palumbo DO Service: -- Author Type: Physician    Filed: 3/18/2021  9:30 AM Encounter Date: 3/17/2021 Status: Signed    : Gloria Palumbo DO (Physician)    Addended by: GLORIA PALUMBO on: 3/18/2021 09:30 AM        Modules accepted: Orders

## 2021-10-10 ENCOUNTER — HEALTH MAINTENANCE LETTER (OUTPATIENT)
Age: 26
End: 2021-10-10

## 2022-05-17 ENCOUNTER — OFFICE VISIT (OUTPATIENT)
Dept: FAMILY MEDICINE | Facility: CLINIC | Age: 27
End: 2022-05-17
Payer: COMMERCIAL

## 2022-05-17 VITALS
WEIGHT: 168 LBS | RESPIRATION RATE: 12 BRPM | SYSTOLIC BLOOD PRESSURE: 98 MMHG | BODY MASS INDEX: 28.16 KG/M2 | DIASTOLIC BLOOD PRESSURE: 50 MMHG | HEART RATE: 80 BPM

## 2022-05-17 DIAGNOSIS — Z3A.12 12 WEEKS GESTATION OF PREGNANCY: Primary | ICD-10-CM

## 2022-05-17 DIAGNOSIS — N92.6 IRREGULAR MENSES: ICD-10-CM

## 2022-05-17 PROBLEM — B37.9 YEAST INFECTION: Status: RESOLVED | Noted: 2017-10-31 | Resolved: 2022-05-17

## 2022-05-17 PROBLEM — Z34.00 NORMAL PREGNANCY, FIRST: Status: RESOLVED | Noted: 2017-06-07 | Resolved: 2022-05-17

## 2022-05-17 PROBLEM — Z34.90 PREGNANT: Status: RESOLVED | Noted: 2018-01-30 | Resolved: 2022-05-17

## 2022-05-17 LAB
ABO/RH(D): NORMAL
ALBUMIN UR-MCNC: NEGATIVE MG/DL
ANTIBODY SCREEN: NEGATIVE
BASOPHILS # BLD AUTO: 0 10E3/UL (ref 0–0.2)
BASOPHILS NFR BLD AUTO: 0 %
EOSINOPHIL # BLD AUTO: 0.1 10E3/UL (ref 0–0.7)
EOSINOPHIL NFR BLD AUTO: 1 %
ERYTHROCYTE [DISTWIDTH] IN BLOOD BY AUTOMATED COUNT: 12.2 % (ref 10–15)
GLUCOSE UR STRIP-MCNC: NEGATIVE MG/DL
HBA1C MFR BLD: 5 % (ref 0–5.6)
HCG UR QL: POSITIVE
HCT VFR BLD AUTO: 38.1 % (ref 35–47)
HGB BLD-MCNC: 12.7 G/DL (ref 11.7–15.7)
HIV 1+2 AB+HIV1 P24 AG SERPL QL IA: NEGATIVE
IMM GRANULOCYTES # BLD: 0 10E3/UL
IMM GRANULOCYTES NFR BLD: 0 %
KETONES UR STRIP-MCNC: NEGATIVE MG/DL
LYMPHOCYTES # BLD AUTO: 1.7 10E3/UL (ref 0.8–5.3)
LYMPHOCYTES NFR BLD AUTO: 16 %
MCH RBC QN AUTO: 31.1 PG (ref 26.5–33)
MCHC RBC AUTO-ENTMCNC: 33.3 G/DL (ref 31.5–36.5)
MCV RBC AUTO: 93 FL (ref 78–100)
MONOCYTES # BLD AUTO: 0.7 10E3/UL (ref 0–1.3)
MONOCYTES NFR BLD AUTO: 7 %
NEUTROPHILS # BLD AUTO: 7.9 10E3/UL (ref 1.6–8.3)
NEUTROPHILS NFR BLD AUTO: 76 %
PLATELET # BLD AUTO: 199 10E3/UL (ref 150–450)
RBC # BLD AUTO: 4.09 10E6/UL (ref 3.8–5.2)
SPECIMEN EXPIRATION DATE: NORMAL
WBC # BLD AUTO: 10.4 10E3/UL (ref 4–11)

## 2022-05-17 PROCEDURE — 81025 URINE PREGNANCY TEST: CPT | Performed by: PHYSICIAN ASSISTANT

## 2022-05-17 PROCEDURE — 83036 HEMOGLOBIN GLYCOSYLATED A1C: CPT | Performed by: PHYSICIAN ASSISTANT

## 2022-05-17 PROCEDURE — 0054A COVID-19,PF,PFIZER (12+ YRS): CPT | Performed by: PHYSICIAN ASSISTANT

## 2022-05-17 PROCEDURE — 83655 ASSAY OF LEAD: CPT | Mod: 90 | Performed by: PHYSICIAN ASSISTANT

## 2022-05-17 PROCEDURE — 91305 COVID-19,PF,PFIZER (12+ YRS): CPT | Performed by: PHYSICIAN ASSISTANT

## 2022-05-17 PROCEDURE — 87340 HEPATITIS B SURFACE AG IA: CPT | Performed by: PHYSICIAN ASSISTANT

## 2022-05-17 PROCEDURE — 87389 HIV-1 AG W/HIV-1&-2 AB AG IA: CPT | Performed by: PHYSICIAN ASSISTANT

## 2022-05-17 PROCEDURE — 99000 SPECIMEN HANDLING OFFICE-LAB: CPT | Performed by: PHYSICIAN ASSISTANT

## 2022-05-17 PROCEDURE — 86762 RUBELLA ANTIBODY: CPT | Performed by: PHYSICIAN ASSISTANT

## 2022-05-17 PROCEDURE — 86780 TREPONEMA PALLIDUM: CPT | Performed by: PHYSICIAN ASSISTANT

## 2022-05-17 PROCEDURE — 36415 COLL VENOUS BLD VENIPUNCTURE: CPT | Performed by: PHYSICIAN ASSISTANT

## 2022-05-17 PROCEDURE — 86900 BLOOD TYPING SEROLOGIC ABO: CPT | Performed by: PHYSICIAN ASSISTANT

## 2022-05-17 PROCEDURE — 86803 HEPATITIS C AB TEST: CPT | Performed by: PHYSICIAN ASSISTANT

## 2022-05-17 PROCEDURE — 85025 COMPLETE CBC W/AUTO DIFF WBC: CPT | Performed by: PHYSICIAN ASSISTANT

## 2022-05-17 PROCEDURE — 86850 RBC ANTIBODY SCREEN: CPT | Performed by: PHYSICIAN ASSISTANT

## 2022-05-17 PROCEDURE — 87086 URINE CULTURE/COLONY COUNT: CPT | Performed by: PHYSICIAN ASSISTANT

## 2022-05-17 PROCEDURE — 81003 URINALYSIS AUTO W/O SCOPE: CPT | Performed by: PHYSICIAN ASSISTANT

## 2022-05-17 PROCEDURE — 99213 OFFICE O/P EST LOW 20 MIN: CPT | Mod: 25 | Performed by: PHYSICIAN ASSISTANT

## 2022-05-17 PROCEDURE — 86901 BLOOD TYPING SEROLOGIC RH(D): CPT | Performed by: PHYSICIAN ASSISTANT

## 2022-05-17 RX ORDER — PNV NO.95/FERROUS FUM/FOLIC AC 28MG-0.8MG
1 TABLET ORAL DAILY
Qty: 100 CAPSULE | Refills: 3 | Status: SHIPPED | OUTPATIENT
Start: 2022-05-17 | End: 2023-03-23

## 2022-05-17 NOTE — LETTER
05/17/22          To Whom It May Concern:      Lizbeth Ramos (1995) is pregnant.  Estimated Date of Delivery: Nov 28, 2022        Sincerely,    Opal Lopze PA-C

## 2022-05-17 NOTE — PATIENT INSTRUCTIONS
Pregnancy: 12 weeks    Due Date: November 28, 2022    Next visit in 4 weeks  With Dr. Palumbo  For Your First OB Visit        Someone should be calling you within 2-3 days to schedule your ultrasound appointment.  If you would like to schedule your ultrasound yourself, call #[phone].

## 2022-05-18 LAB
HBV SURFACE AG SERPL QL IA: NONREACTIVE
HCV AB SERPL QL IA: NEGATIVE
RUBV IGG SERPL QL IA: 0.82 INDEX
RUBV IGG SERPL QL IA: NORMAL
T PALLIDUM AB SER QL: NONREACTIVE

## 2022-05-19 LAB
BACTERIA UR CULT: NORMAL
LEAD BLDV-MCNC: <2 UG/DL

## 2022-05-21 ENCOUNTER — HEALTH MAINTENANCE LETTER (OUTPATIENT)
Age: 27
End: 2022-05-21

## 2022-05-23 NOTE — PROGRESS NOTES
ASSESSMENT and PLAN:  1. Pregnancy Intake Appointment  Lizbeth Ramos is 27 year old and .  Patient's last menstrual period was 2022.  Estimated Date of Delivery: 2022  She will be seeing Dr. Palumbo for OB care at [facility].  Screening pregnancy lab work was drawn.  Prenatal vitamin prescribed.  Problem list and current medications reviewed and updated.  Dating ultrasound ordered.  Potential exposures/risks discussed: work environment, raw meat/seafood/deli meat, home construction, cats, caffeine.  Follow up in 4 weeks.          HPI:  Lizbeth Ramos is a 27 year old female here for pregnancy intake.  She is .  Patient's last menstrual period was 2022.  Positive home pregnancy test on 4/10/22.      Past Medical History:   Diagnosis Date     Depressive disorder       (normal spontaneous vaginal delivery)      Postpartum depression      Urinary tract infection         Past Surgical History:   Procedure Laterality Date     NO PAST SURGERIES          Current Outpatient Medications   Medication     Prenatal MV-Min-Fe Fum-FA-DHA (PRENATAL MULTIVITAMIN PLUS DHA) 27-0.8-250 MG CAPS     No current facility-administered medications for this visit.          OBJECTIVE:  No Known Allergies  BP 98/50 (BP Location: Left arm, Patient Position: Sitting, Cuff Size: Adult Large)   Pulse 80   Resp 12   Wt 76.2 kg (168 lb)   LMP 2022   BMI 28.16 kg/m     Body mass index is 28.16 kg/m .     Vital signs stable as recorded above.  General: Patient is alert and oriented x 3, in no apparent distress  Fetal Exam: FHR not heard, fundal height not palpable, Fetal movement seen on handheld ultrasound machine      Office Visit on 2022   Component Date Value Ref Range Status     hCG Urine Qualitative 2022 Positive (A) Negative Final     Culture 2022 <10,000 CFU/mL Mixture of urogenital irving   Final     Hepatitis B Surface Antigen 2022 Nonreactive  Nonreactive Final      HIV Antigen Antibody Combo 05/17/2022 Negative  Negative Final     Rubella Jaylin IgG Instrument Value 05/17/2022 0.82  <0.90 Index Final     Rubella Antibody IgG 05/17/2022 No detectable antibody.   Final     Treponema Antibody Total 05/17/2022 Nonreactive  Nonreactive Final     Hemoglobin A1C 05/17/2022 5.0  0.0 - 5.6 % Final     Hepatitis C Antibody 05/17/2022 Negative  Negative Final     Lead Venous Blood 05/17/2022 <2.0  <=4.9 ug/dL Final     ABO/RH(D) 05/17/2022 B POS   Final     Antibody Screen 05/17/2022 Negative  Negative Final     SPECIMEN EXPIRATION DATE 05/17/2022 [id number]   Final     WBC Count 05/17/2022 10.4  4.0 - 11.0 10e3/uL Final     RBC Count 05/17/2022 4.09  3.80 - 5.20 10e6/uL Final     Hemoglobin 05/17/2022 12.7  11.7 - 15.7 g/dL Final     Hematocrit 05/17/2022 38.1  35.0 - 47.0 % Final     MCV 05/17/2022 93  78 - 100 fL Final     MCH 05/17/2022 31.1  26.5 - 33.0 pg Final     MCHC 05/17/2022 33.3  31.5 - 36.5 g/dL Final     RDW 05/17/2022 12.2  10.0 - 15.0 % Final     Platelet Count 05/17/2022 199  150 - 450 10e3/uL Final     % Neutrophils 05/17/2022 76  % Final     % Lymphocytes 05/17/2022 16  % Final     % Monocytes 05/17/2022 7  % Final     % Eosinophils 05/17/2022 1  % Final     % Basophils 05/17/2022 0  % Final     % Immature Granulocytes 05/17/2022 0  % Final     Absolute Neutrophils 05/17/2022 7.9  1.6 - 8.3 10e3/uL Final     Absolute Lymphocytes 05/17/2022 1.7  0.8 - 5.3 10e3/uL Final     Absolute Monocytes 05/17/2022 0.7  0.0 - 1.3 10e3/uL Final     Absolute Eosinophils 05/17/2022 0.1  0.0 - 0.7 10e3/uL Final     Absolute Basophils 05/17/2022 0.0  0.0 - 0.2 10e3/uL Final     Absolute Immature Granulocytes 05/17/2022 0.0  <=0.4 10e3/uL Final     Glucose Urine 05/17/2022 Negative  Negative mg/dL Final     Ketones Urine 05/17/2022 Negative  Negative mg/dL Final     Protein Albumin Urine 05/17/2022 Negative  Negative mg/dL Final          Other screening pregnancy lab work is  pending.      Please see OB Episode for complete details.    This dictation uses voice recognition software, which may contain typographical errors.

## 2022-05-27 ENCOUNTER — HOSPITAL ENCOUNTER (OUTPATIENT)
Dept: ULTRASOUND IMAGING | Facility: HOSPITAL | Age: 27
Discharge: HOME | End: 2022-05-27
Attending: PHYSICIAN ASSISTANT | Admitting: PHYSICIAN ASSISTANT
Payer: COMMERCIAL

## 2022-05-27 DIAGNOSIS — Z3A.12 12 WEEKS GESTATION OF PREGNANCY: ICD-10-CM

## 2022-05-27 PROCEDURE — 76801 OB US < 14 WKS SINGLE FETUS: CPT

## 2022-06-20 ENCOUNTER — PRENATAL OFFICE VISIT (OUTPATIENT)
Dept: FAMILY MEDICINE | Facility: CLINIC | Age: 27
End: 2022-06-20
Payer: COMMERCIAL

## 2022-06-20 VITALS
SYSTOLIC BLOOD PRESSURE: 100 MMHG | HEART RATE: 89 BPM | TEMPERATURE: 97.3 F | WEIGHT: 166 LBS | DIASTOLIC BLOOD PRESSURE: 63 MMHG | BODY MASS INDEX: 27.83 KG/M2

## 2022-06-20 DIAGNOSIS — O09.899 RUBELLA NON-IMMUNE STATUS, ANTEPARTUM: ICD-10-CM

## 2022-06-20 DIAGNOSIS — Z34.80 SUPERVISION OF OTHER NORMAL PREGNANCY: Primary | ICD-10-CM

## 2022-06-20 DIAGNOSIS — Z28.39 RUBELLA NON-IMMUNE STATUS, ANTEPARTUM: ICD-10-CM

## 2022-06-20 DIAGNOSIS — R42 DIZZINESS: ICD-10-CM

## 2022-06-20 DIAGNOSIS — O21.9 NAUSEA AND VOMITING IN PREGNANCY: ICD-10-CM

## 2022-06-20 PROCEDURE — 99213 OFFICE O/P EST LOW 20 MIN: CPT | Performed by: FAMILY MEDICINE

## 2022-06-20 PROCEDURE — 99207 PR PRENATAL VISIT: CPT | Performed by: FAMILY MEDICINE

## 2022-06-20 NOTE — PROGRESS NOTES
First OB   Feeling ok.   + Nausea and fatigue. Energy a lot lower this pregnancy  + dizziness  1-2 weeks   Blood sugar  Trying to get up.   A bit of vertigo if I have to pace back and forth  Nausea is getting better  PNV   Improved. Appetite  No pelvic pain, vaginal bleeding, discharge  No other concerns      Taking PNV: yes    Labs/imaging reviewed: rubella non immune. Changed dating to 12 wk US.     Discussed screening for aneuploidy and neural tube defects, carrier screening SMA, fragile X, thalassemia, CF, etc.  Declines for now. Will talk with partner    Preformed physical exam : see flow sheet  Reviewed [place], past medical history, past surgical history, family history, genetic history, and previous obstetrical history.   Encouraged good nutrition, well balanced diet, regular activity.  Discussed foods to avoid.  And other applicable safety/health risks in pregnancy.    Lizbeth was seen today for prenatal care and dizziness.    Diagnoses and all orders for this visit:    Supervision of other normal pregnancy  -     Urinalysis, OB Screen; Future  -     US OB > 14 Weeks; Future    Nausea and vomiting in pregnancy: She declines any medications at this time as she states that the nausea is getting better.  Dizziness: We discussed taking it easy and being slow to get up from sitting position and moving about.  Reviewed fluid shifts in pregnancy nausea and vomiting etc. are likely contributing dizziness and expect that it will likely resolve as well.    Rubella non-immune status, antepartum  Comments:  MMR postpartum      Preeclampsia risk factors:  High risk factors (1+): NONE  Moderate risk factors (2+): none    Gloria Palumbo, DO

## 2022-07-21 ENCOUNTER — HOSPITAL ENCOUNTER (OUTPATIENT)
Dept: ULTRASOUND IMAGING | Facility: HOSPITAL | Age: 27
Discharge: HOME | End: 2022-07-21
Attending: FAMILY MEDICINE | Admitting: FAMILY MEDICINE
Payer: COMMERCIAL

## 2022-07-21 DIAGNOSIS — Z34.80 SUPERVISION OF OTHER NORMAL PREGNANCY: ICD-10-CM

## 2022-07-21 PROCEDURE — 76805 OB US >/= 14 WKS SNGL FETUS: CPT

## 2022-07-22 ENCOUNTER — PRENATAL OFFICE VISIT (OUTPATIENT)
Dept: FAMILY MEDICINE | Facility: CLINIC | Age: 27
End: 2022-07-22
Payer: COMMERCIAL

## 2022-07-22 VITALS
HEART RATE: 78 BPM | RESPIRATION RATE: 16 BRPM | WEIGHT: 165 LBS | DIASTOLIC BLOOD PRESSURE: 67 MMHG | BODY MASS INDEX: 27.49 KG/M2 | HEIGHT: 65 IN | TEMPERATURE: 97.9 F | SYSTOLIC BLOOD PRESSURE: 106 MMHG

## 2022-07-22 DIAGNOSIS — U07.1 COVID-19 VIRUS INFECTION: ICD-10-CM

## 2022-07-22 DIAGNOSIS — Z34.80 SUPERVISION OF OTHER NORMAL PREGNANCY: Primary | ICD-10-CM

## 2022-07-22 PROCEDURE — 81003 URINALYSIS AUTO W/O SCOPE: CPT | Performed by: FAMILY MEDICINE

## 2022-07-22 PROCEDURE — 99207 PR PRENATAL VISIT: CPT | Performed by: FAMILY MEDICINE

## 2022-07-22 NOTE — PROGRESS NOTES
No ctx, lof, bleeding, or discharge.  No HA or vision changes.  Good FM : flutters  Had bachelorette party in [place]  Got covid- 6/30 + covid  First couple days were tough and after that minor symptoms  And then partner and daughter got it too.   First couple weeks of July just at home.     Feeling more pressure in pelvis with bending over compared to first pregnancy, also already showing more. (reviewd round ligament pain, relaxin hormone etc. CL 3.1cm and provided reassurance with 2nd pregnancy aches and pains could be different).   Tired but improved.     Exam:   See flowsheet  GEN:  27 year old female sitting comfortably in no apparent distress.   HEENT:  no scleral icterus, buccal mucosa moist  CHEST/LUNG: No respiratory distress, unlabored breathing  ABD:  Soft, non-tender, Gravid uterus  PSYCH:  Mood and affect appropriate    Lizbeth was seen today for prenatal care.    Diagnoses and all orders for this visit:    Supervision of other normal pregnancy:  Reviewed normal anatomy US.   -     Urinalysis, OB Screen; Future  -     Urinalysis, OB Screen    COVID-19 virus infection: mild symptomatic disease. Recovered. Will plan for Growth US in 3rd trimester.

## 2022-07-23 PROBLEM — Z3A.12 12 WEEKS GESTATION OF PREGNANCY: Status: RESOLVED | Noted: 2022-05-17 | Resolved: 2022-07-23

## 2022-07-23 PROBLEM — Z34.80 SUPERVISION OF OTHER NORMAL PREGNANCY: Status: ACTIVE | Noted: 2022-07-23

## 2022-07-23 PROBLEM — U07.1 COVID-19 VIRUS INFECTION: Status: ACTIVE | Noted: 2022-07-23

## 2022-08-24 ENCOUNTER — PRENATAL OFFICE VISIT (OUTPATIENT)
Dept: FAMILY MEDICINE | Facility: CLINIC | Age: 27
End: 2022-08-24
Payer: COMMERCIAL

## 2022-08-24 VITALS
SYSTOLIC BLOOD PRESSURE: 98 MMHG | WEIGHT: 167.31 LBS | TEMPERATURE: 98.2 F | RESPIRATION RATE: 14 BRPM | BODY MASS INDEX: 27.88 KG/M2 | DIASTOLIC BLOOD PRESSURE: 60 MMHG | HEART RATE: 76 BPM

## 2022-08-24 DIAGNOSIS — Z34.80 SUPERVISION OF OTHER NORMAL PREGNANCY: Primary | ICD-10-CM

## 2022-08-24 PROCEDURE — 81003 URINALYSIS AUTO W/O SCOPE: CPT | Performed by: FAMILY MEDICINE

## 2022-08-24 PROCEDURE — 99207 PR PRENATAL VISIT: CPT | Performed by: FAMILY MEDICINE

## 2022-08-24 NOTE — PROGRESS NOTES
No ctx, lof, bleeding, or discharge.  No HA or vision changes.  Good FM : yes!   August has been good  Bloated and digestion  Keeping moving. With walks  Watching what she is eating. How much.   Some bouts fatigue.   Some aches and pains/muscle spasms but nothing consistent or concerning.    Exam:   See flowsheet  GEN:  27 year old female sitting comfortably in no apparent distress.   HEENT:  no scleral icterus, buccal mucosa moist  CHEST/LUNG: No respiratory distress, unlabored breathing  ABD:  Soft, non-tender, Gravid uterus  PSYCH:  Mood and affect appropriate    Lizbeth was seen today for prenatal care.    Diagnoses and all orders for this visit:    Supervision of other normal pregnancy  -     Urinalysis, OB Screen; Future      Its a girl!  Follow-up in 3 to 4 weeks.  Discussed 1 hour GTT, labs and Tdap next visit.  Reviewed gestational diabetes.

## 2022-09-18 ENCOUNTER — HEALTH MAINTENANCE LETTER (OUTPATIENT)
Age: 27
End: 2022-09-18

## 2022-09-28 ENCOUNTER — PRENATAL OFFICE VISIT (OUTPATIENT)
Dept: FAMILY MEDICINE | Facility: CLINIC | Age: 27
End: 2022-09-28
Payer: COMMERCIAL

## 2022-09-28 VITALS
RESPIRATION RATE: 21 BRPM | HEART RATE: 70 BPM | TEMPERATURE: 97.9 F | BODY MASS INDEX: 29.49 KG/M2 | SYSTOLIC BLOOD PRESSURE: 98 MMHG | OXYGEN SATURATION: 98 % | WEIGHT: 177 LBS | DIASTOLIC BLOOD PRESSURE: 62 MMHG

## 2022-09-28 DIAGNOSIS — U07.1 COVID-19 VIRUS INFECTION: ICD-10-CM

## 2022-09-28 DIAGNOSIS — Z34.83 ENCOUNTER FOR SUPERVISION OF OTHER NORMAL PREGNANCY IN THIRD TRIMESTER: Primary | ICD-10-CM

## 2022-09-28 LAB
ALBUMIN UR-MCNC: NEGATIVE MG/DL
GLUCOSE 1H P 50 G GLC PO SERPL-MCNC: 115 MG/DL (ref 70–129)
GLUCOSE UR STRIP-MCNC: NEGATIVE MG/DL
HGB BLD-MCNC: 11.5 G/DL (ref 11.7–15.7)
KETONES UR STRIP-MCNC: NEGATIVE MG/DL

## 2022-09-28 PROCEDURE — 90471 IMMUNIZATION ADMIN: CPT | Performed by: FAMILY MEDICINE

## 2022-09-28 PROCEDURE — 86780 TREPONEMA PALLIDUM: CPT | Performed by: FAMILY MEDICINE

## 2022-09-28 PROCEDURE — 82950 GLUCOSE TEST: CPT | Performed by: FAMILY MEDICINE

## 2022-09-28 PROCEDURE — 90715 TDAP VACCINE 7 YRS/> IM: CPT | Performed by: FAMILY MEDICINE

## 2022-09-28 PROCEDURE — 99207 PR PRENATAL VISIT: CPT | Performed by: FAMILY MEDICINE

## 2022-09-28 PROCEDURE — 81003 URINALYSIS AUTO W/O SCOPE: CPT | Performed by: FAMILY MEDICINE

## 2022-09-28 PROCEDURE — 36415 COLL VENOUS BLD VENIPUNCTURE: CPT | Performed by: FAMILY MEDICINE

## 2022-09-28 NOTE — PROGRESS NOTES
No lof, bleeding, or discharge.  No HA or vision changes.  Good FM : yes  Low back pain.    If sitting for too long. Tries to move from standing to sitting to help with this.   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   [place] hogan     Exam:   See flowsheet  GEN:  27 year old female sitting comfortably in no apparent distress.   HEENT:  no scleral icterus, buccal mucosa moist  CHEST/LUNG: No respiratory distress, unlabored breathing  ABD:  Soft, non-tender, Gravid uterus  PSYCH:  Mood and affect appropriate    Lizbeth was seen today for prenatal care.    Diagnoses and all orders for this visit:    Encounter for supervision of other normal pregnancy in third trimester  -     Urinalysis, OB Screen; Future  -     Urinalysis, OB Screen  -     US OB > 14 Weeks; Future  -     Glucose tolerance, gest screen, 1 hour; Future  -     OB hemoglobin; Future  -     Treponema Abs w Reflex to RPR and Titer; Future  -     TDAP VACCINE (Adacel, Boostrix)  [[id number]]  -     Glucose tolerance, gest screen, 1 hour  -     OB hemoglobin  -     Treponema Abs w Reflex to RPR and Titer    COVID-19 virus infection  Comments:  2nd trimester. needs 3rd tri growth in 2 weeks.   Orders:  -     US OB > 14 Weeks; Future      Reviewed kick counts  Discussed can probably do one more visit then will transition to new provider.   F/u 2 weeks.

## 2022-09-28 NOTE — Clinical Note
Can we get her scheduled with argenis in about 4 weeks ( she is going to see me in 2 weeks for one last visit)

## 2022-09-29 LAB — T PALLIDUM AB SER QL: NONREACTIVE

## 2022-10-12 ENCOUNTER — PRENATAL OFFICE VISIT (OUTPATIENT)
Dept: FAMILY MEDICINE | Facility: CLINIC | Age: 27
End: 2022-10-12
Payer: COMMERCIAL

## 2022-10-12 VITALS
WEIGHT: 179 LBS | TEMPERATURE: 97.3 F | SYSTOLIC BLOOD PRESSURE: 94 MMHG | DIASTOLIC BLOOD PRESSURE: 58 MMHG | BODY MASS INDEX: 29.82 KG/M2 | HEART RATE: 78 BPM

## 2022-10-12 DIAGNOSIS — Z34.83 ENCOUNTER FOR SUPERVISION OF OTHER NORMAL PREGNANCY IN THIRD TRIMESTER: Primary | ICD-10-CM

## 2022-10-12 PROCEDURE — 81003 URINALYSIS AUTO W/O SCOPE: CPT | Performed by: FAMILY MEDICINE

## 2022-10-12 PROCEDURE — 99207 PR PRENATAL VISIT: CPT | Performed by: FAMILY MEDICINE

## 2022-10-12 NOTE — PROGRESS NOTES
No lof, bleeding, or discharge.  No HA or vision changes.  Good FM : yes  Adjusting more to the pain.   seems to be a little bit better now.   Heating pad  A lot of movement.     Exam:   See flowsheet  GEN:  27 year old female sitting comfortably in no apparent distress.   HEENT:  no scleral icterus, buccal mucosa moist  CHEST/LUNG: No respiratory distress, unlabored breathing  ABD:  Soft, non-tender, Gravid uterus  PSYCH:  Mood and affect appropriate    Lizbeth was seen today for prenatal care.    Diagnoses and all orders for this visit:    Encounter for supervision of normal pregnancy in third trimester  -     Urinalysis, OB Screen; Future  -     Urinalysis, OB Screen    Scheduled for growth us in 2 days due to having covid 2nd tri.   [place]- more frequent than last pregnancy  Reviewed s/s of  labor.   Overall sound okay getting 3 in an hour but then that will be it for the day. Hasn't had any today. Reviewed can increase and start earlier each pregnancy.     F/u 2 weeks transitioning to Dr Moser.

## 2022-10-14 ENCOUNTER — HOSPITAL ENCOUNTER (OUTPATIENT)
Dept: ULTRASOUND IMAGING | Facility: HOSPITAL | Age: 27
Discharge: HOME | End: 2022-10-14
Attending: FAMILY MEDICINE | Admitting: FAMILY MEDICINE
Payer: COMMERCIAL

## 2022-10-14 DIAGNOSIS — U07.1 COVID-19 VIRUS INFECTION: ICD-10-CM

## 2022-10-14 DIAGNOSIS — Z34.83 ENCOUNTER FOR SUPERVISION OF OTHER NORMAL PREGNANCY IN THIRD TRIMESTER: ICD-10-CM

## 2022-10-14 PROCEDURE — 76816 OB US FOLLOW-UP PER FETUS: CPT

## 2022-10-31 ENCOUNTER — TRANSCRIBE ORDERS (OUTPATIENT)
Dept: MATERNAL FETAL MEDICINE | Facility: HOSPITAL | Age: 27
End: 2022-10-31

## 2022-10-31 ENCOUNTER — PRENATAL OFFICE VISIT (OUTPATIENT)
Dept: FAMILY MEDICINE | Facility: CLINIC | Age: 27
End: 2022-10-31
Payer: COMMERCIAL

## 2022-10-31 VITALS
OXYGEN SATURATION: 98 % | RESPIRATION RATE: 21 BRPM | HEART RATE: 92 BPM | WEIGHT: 182 LBS | BODY MASS INDEX: 30.32 KG/M2 | TEMPERATURE: 97.7 F | DIASTOLIC BLOOD PRESSURE: 58 MMHG | SYSTOLIC BLOOD PRESSURE: 100 MMHG

## 2022-10-31 DIAGNOSIS — O35.07X0 MICROCEPHALY OF FETUS AFFECTING MANAGEMENT OF MOTHER IN SINGLETON PREGNANCY, ANTEPARTUM: ICD-10-CM

## 2022-10-31 DIAGNOSIS — O26.90 PREGNANCY RELATED CONDITION, ANTEPARTUM: Primary | ICD-10-CM

## 2022-10-31 DIAGNOSIS — Z34.83 ENCOUNTER FOR SUPERVISION OF OTHER NORMAL PREGNANCY IN THIRD TRIMESTER: Primary | ICD-10-CM

## 2022-10-31 PROCEDURE — 0124A COVID-19,PF,PFIZER BOOSTER BIVALENT: CPT | Performed by: FAMILY MEDICINE

## 2022-10-31 PROCEDURE — 99207 PR PRENATAL VISIT: CPT | Performed by: FAMILY MEDICINE

## 2022-10-31 PROCEDURE — 91312 COVID-19,PF,PFIZER BOOSTER BIVALENT: CPT | Performed by: FAMILY MEDICINE

## 2022-10-31 PROCEDURE — 90471 IMMUNIZATION ADMIN: CPT | Performed by: FAMILY MEDICINE

## 2022-10-31 PROCEDURE — 81003 URINALYSIS AUTO W/O SCOPE: CPT | Performed by: FAMILY MEDICINE

## 2022-10-31 PROCEDURE — 87653 STREP B DNA AMP PROBE: CPT | Performed by: FAMILY MEDICINE

## 2022-10-31 PROCEDURE — 90686 IIV4 VACC NO PRSV 0.5 ML IM: CPT | Performed by: FAMILY MEDICINE

## 2022-10-31 NOTE — PROGRESS NOTES
Starting to get more uncomfortable- sciatica?  Pain in inner groin bilaterally.  Lower back pain that radiates to butt.  Trying to stretch and stay active.     Able to take 4 months off for maternity     URI-covid neg.  No fever.  Toddler with fever but okay.     Reviewed last us showing small head- scheduled mfm    Concerns: as above  .  No vaginal bleeding, LOF, contractions.  No HA, RUQ pain, N/V, visual changes.  Discussed signs of labor and when to call or come in.  Discussed kick counts and fetal movement.  Reportable signs and symptoms discussed.  GBS done today.  Labor precautions discussed.  RTC 1 week.  Prenatal flowsheet information is reviewed.    Melanie Moser MD

## 2022-11-01 LAB — GP B STREP DNA SPEC QL NAA+PROBE: NEGATIVE

## 2022-11-03 ENCOUNTER — PRE VISIT (OUTPATIENT)
Dept: MATERNAL FETAL MEDICINE | Facility: HOSPITAL | Age: 27
End: 2022-11-03

## 2022-11-09 ENCOUNTER — OFFICE VISIT (OUTPATIENT)
Dept: MATERNAL FETAL MEDICINE | Facility: HOSPITAL | Age: 27
End: 2022-11-09
Attending: FAMILY MEDICINE
Payer: COMMERCIAL

## 2022-11-09 ENCOUNTER — ANCILLARY PROCEDURE (OUTPATIENT)
Dept: ULTRASOUND IMAGING | Facility: HOSPITAL | Age: 27
End: 2022-11-09
Attending: FAMILY MEDICINE
Payer: COMMERCIAL

## 2022-11-09 VITALS — DIASTOLIC BLOOD PRESSURE: 66 MMHG | HEART RATE: 88 BPM | SYSTOLIC BLOOD PRESSURE: 98 MMHG

## 2022-11-09 DIAGNOSIS — O26.90 PREGNANCY RELATED CONDITION, ANTEPARTUM: ICD-10-CM

## 2022-11-09 DIAGNOSIS — O36.5990 FETAL GROWTH RESTRICTION ANTEPARTUM: Primary | ICD-10-CM

## 2022-11-09 PROCEDURE — 59025 FETAL NON-STRESS TEST: CPT

## 2022-11-09 PROCEDURE — 2894A VOIDCORRECT: CPT | Mod: 26 | Performed by: OBSTETRICS & GYNECOLOGY

## 2022-11-09 PROCEDURE — 76820 UMBILICAL ARTERY ECHO: CPT | Mod: 26 | Performed by: OBSTETRICS & GYNECOLOGY

## 2022-11-09 PROCEDURE — 59025 FETAL NON-STRESS TEST: CPT | Mod: 26 | Performed by: OBSTETRICS & GYNECOLOGY

## 2022-11-09 PROCEDURE — 99203 OFFICE O/P NEW LOW 30 MIN: CPT | Mod: 25 | Performed by: OBSTETRICS & GYNECOLOGY

## 2022-11-09 PROCEDURE — 76811 OB US DETAILED SNGL FETUS: CPT | Mod: 26 | Performed by: OBSTETRICS & GYNECOLOGY

## 2022-11-09 PROCEDURE — 76811 OB US DETAILED SNGL FETUS: CPT

## 2022-11-09 NOTE — PROGRESS NOTES
Lizbeth presents to [facility] clinic for ultrasound and recommendations due to concern for fetal microcephaly. I reviewed the prenatal record.    Impression:  1) Doran intrauterine pregnancy at 36w 1d gestational age.   2) None of the anomalies commonly detected by ultrasound were evident in the detailed fetal anatomic survey, however some anatomy was seen suboptimally as outlined above.   3) Growth parameters and estimated fetal weight were consistent with fetal growth restriction with EFW 6%. Fetal head circumference is lagging, but not consistent with microcephaly.  4) The amniotic fluid volume appeared normal.  5) Normal fetal activity for gestational age.  6) The NST is reactive and reassuring.  7) The umbilical artery Doppler studies are within normal limits.    Recommendations:  Thank-you for referring your patient for a comprehensive ultrasound.      I discussed the findings on today's ultrasound with the patient. The findings on today's ultrasound are consistent with fetal growth restriction (FGR). I reviewed that we consider a pregnancy to be affected by FGR when the overall EFW is <10th% or if the abdominal circumference (AC) is < 10th% as they have been found to be equally predictive of SGA. We discussed the potential etiologies of FGR including incorrect dating, constitutional, infectious etiologies (specifically CMV), fetal genetic and structural abnormalities as well as placental and umbilical cord abnormalities.      Her dating was reviewed and is appropriate. She declined genetic screening. She is otherwise healthy and has no significant medical problems. There is no family history of congential anomalies or genetic syndromes. She does have a history of COVID in June during this pregnancy, but otherwise no history of viral symptoms. Of note, her first child weight 6 pounds at 40 weeks, leading me to believe this may also be constitutional.    The recommend surveillance and management of pregnancies  complicated by FGR includes weekly UA Dopplers and  surveillance. These are scheduled in our office. If Doppler studies remain normal and the fetal monitoring is reassuring, deliver is recommended between 38-39 weeks. This is recommended to be an induction of labor with  reserved for usual obstetric outcomes. Lizbeth will discuss with your office to schedule. Given the late gestational age, fetal growth will not be reassessed prior to delivery.    Return to primary provider for continued prenatal care.    If you have questions regarding today's evaluation or if we can be of further service, please contact the [facility].    **Fetal anomalies may be present but not detected**

## 2022-11-10 ENCOUNTER — PRENATAL OFFICE VISIT (OUTPATIENT)
Dept: FAMILY MEDICINE | Facility: CLINIC | Age: 27
End: 2022-11-10
Payer: COMMERCIAL

## 2022-11-10 VITALS
WEIGHT: 183 LBS | BODY MASS INDEX: 30.49 KG/M2 | RESPIRATION RATE: 21 BRPM | DIASTOLIC BLOOD PRESSURE: 64 MMHG | HEART RATE: 90 BPM | TEMPERATURE: 97.1 F | SYSTOLIC BLOOD PRESSURE: 98 MMHG | OXYGEN SATURATION: 98 %

## 2022-11-10 DIAGNOSIS — O36.5990 FETAL GROWTH RESTRICTION ANTEPARTUM: ICD-10-CM

## 2022-11-10 DIAGNOSIS — O09.93 SUPERVISION OF HIGH RISK PREGNANCY IN THIRD TRIMESTER: Primary | ICD-10-CM

## 2022-11-10 PROCEDURE — 81003 URINALYSIS AUTO W/O SCOPE: CPT | Performed by: FAMILY MEDICINE

## 2022-11-10 PROCEDURE — 99207 PR PRENATAL VISIT: CPT | Performed by: FAMILY MEDICINE

## 2022-11-10 NOTE — PROGRESS NOTES
Reviewed MFM consultation this week noting FGR and recommendations for close follow-up and monitoring until delivery.     Pt has questions about inductions.   Will consider membrane sweeping at 38weeks per pt request    Mild nausea but no vomiting, diarrhea or fevers    Increased hip and pelvic pain- trying to change positions, exercise ball, stretching helping.     More ginny hogan contractions but nothing regular.    Concerns: as above   .  No vaginal bleeding, LOF, contractions.  No HA, RUQ pain, N/V, visual changes.  Discussed indications, risks, complications and failure rate of inductions.  Discussed signs of labor and when to call or come in.  Discussed kick counts and fetal movement.  Reportable signs and symptoms discussed.  GBS done today.  Labor precautions discussed.  RTC 1 week.  Prenatal flowsheet information is reviewed.    Melanie Moser MD

## 2022-11-16 ENCOUNTER — ANCILLARY PROCEDURE (OUTPATIENT)
Dept: ULTRASOUND IMAGING | Facility: HOSPITAL | Age: 27
End: 2022-11-16
Attending: OBSTETRICS & GYNECOLOGY
Payer: COMMERCIAL

## 2022-11-16 ENCOUNTER — OFFICE VISIT (OUTPATIENT)
Dept: MATERNAL FETAL MEDICINE | Facility: HOSPITAL | Age: 27
End: 2022-11-16
Attending: OBSTETRICS & GYNECOLOGY
Payer: COMMERCIAL

## 2022-11-16 DIAGNOSIS — O36.5990 FETAL GROWTH RESTRICTION ANTEPARTUM: Primary | ICD-10-CM

## 2022-11-16 DIAGNOSIS — O36.5990 FETAL GROWTH RESTRICTION ANTEPARTUM: ICD-10-CM

## 2022-11-16 PROCEDURE — 76820 UMBILICAL ARTERY ECHO: CPT | Mod: 26 | Performed by: OBSTETRICS & GYNECOLOGY

## 2022-11-16 PROCEDURE — 76820 UMBILICAL ARTERY ECHO: CPT

## 2022-11-16 PROCEDURE — 76815 OB US LIMITED FETUS(S): CPT | Mod: 26 | Performed by: OBSTETRICS & GYNECOLOGY

## 2022-11-16 PROCEDURE — 59025 FETAL NON-STRESS TEST: CPT

## 2022-11-16 PROCEDURE — 59025 FETAL NON-STRESS TEST: CPT | Mod: 26 | Performed by: OBSTETRICS & GYNECOLOGY

## 2022-11-16 PROCEDURE — 99207 PR NO CHARGE LOS: CPT | Performed by: OBSTETRICS & GYNECOLOGY

## 2022-11-16 NOTE — NURSING NOTE
NST Performed due to FGR.   reviewed efm tracing. See NST/BPP Doc Flowsheet tab.    Carlene Damico RN    
Declined

## 2022-11-17 ENCOUNTER — PRENATAL OFFICE VISIT (OUTPATIENT)
Dept: FAMILY MEDICINE | Facility: CLINIC | Age: 27
End: 2022-11-17
Payer: COMMERCIAL

## 2022-11-17 VITALS
WEIGHT: 187 LBS | OXYGEN SATURATION: 99 % | HEIGHT: 65 IN | TEMPERATURE: 97.5 F | BODY MASS INDEX: 31.16 KG/M2 | SYSTOLIC BLOOD PRESSURE: 98 MMHG | DIASTOLIC BLOOD PRESSURE: 65 MMHG | RESPIRATION RATE: 20 BRPM | HEART RATE: 93 BPM

## 2022-11-17 DIAGNOSIS — O36.5990 FETAL GROWTH RESTRICTION ANTEPARTUM: ICD-10-CM

## 2022-11-17 DIAGNOSIS — Z34.83 ENCOUNTER FOR SUPERVISION OF OTHER NORMAL PREGNANCY IN THIRD TRIMESTER: Primary | ICD-10-CM

## 2022-11-17 PROCEDURE — 99207 PR PRENATAL VISIT: CPT | Performed by: FAMILY MEDICINE

## 2022-11-17 PROCEDURE — 81003 URINALYSIS AUTO W/O SCOPE: CPT | Performed by: FAMILY MEDICINE

## 2022-11-17 NOTE — PROGRESS NOTES
Reviewed MFM consult/us yesterday  FGR stable and growing.    Weekly NST and delivery 38-39weeks   Would like to do a membrane sweep on the 28th and would be willing to plan to come in the 30th.       Concerns: None  No vaginal bleeding, LOF, contractions.  No HA, RUQ pain, N/V, visual changes.  Discussed indications, risks, complications and failure rate of inductions.  Discussed signs of labor and when to call or come in.  Labor precautions discussed.  RTC 1 week.    Physician Attestation   I, Melanie Moser, saw this patient and have discussed and personally reviewed information outlined in this document.    I agree with the findings and plan of care as documented in the note.      MD Melanie Horn MD

## 2022-11-23 ENCOUNTER — PRENATAL OFFICE VISIT (OUTPATIENT)
Dept: FAMILY MEDICINE | Facility: CLINIC | Age: 27
End: 2022-11-23
Payer: COMMERCIAL

## 2022-11-23 ENCOUNTER — OFFICE VISIT (OUTPATIENT)
Dept: MATERNAL FETAL MEDICINE | Facility: HOSPITAL | Age: 27
End: 2022-11-23
Attending: OBSTETRICS & GYNECOLOGY
Payer: COMMERCIAL

## 2022-11-23 ENCOUNTER — ANCILLARY PROCEDURE (OUTPATIENT)
Dept: ULTRASOUND IMAGING | Facility: HOSPITAL | Age: 27
End: 2022-11-23
Attending: OBSTETRICS & GYNECOLOGY
Payer: COMMERCIAL

## 2022-11-23 VITALS
HEIGHT: 65 IN | WEIGHT: 184 LBS | BODY MASS INDEX: 30.66 KG/M2 | RESPIRATION RATE: 19 BRPM | DIASTOLIC BLOOD PRESSURE: 64 MMHG | OXYGEN SATURATION: 98 % | SYSTOLIC BLOOD PRESSURE: 94 MMHG | HEART RATE: 86 BPM

## 2022-11-23 DIAGNOSIS — Z34.83 ENCOUNTER FOR SUPERVISION OF OTHER NORMAL PREGNANCY IN THIRD TRIMESTER: Primary | ICD-10-CM

## 2022-11-23 DIAGNOSIS — O36.5990 FETAL GROWTH RESTRICTION ANTEPARTUM: ICD-10-CM

## 2022-11-23 PROCEDURE — 99207 PR NO CHARGE LOS: CPT | Performed by: OBSTETRICS & GYNECOLOGY

## 2022-11-23 PROCEDURE — 99207 PR PRENATAL VISIT: CPT | Performed by: FAMILY MEDICINE

## 2022-11-23 PROCEDURE — 76820 UMBILICAL ARTERY ECHO: CPT

## 2022-11-23 PROCEDURE — 59025 FETAL NON-STRESS TEST: CPT

## 2022-11-23 PROCEDURE — 81003 URINALYSIS AUTO W/O SCOPE: CPT | Performed by: FAMILY MEDICINE

## 2022-11-23 PROCEDURE — 59025 FETAL NON-STRESS TEST: CPT | Mod: 26 | Performed by: OBSTETRICS & GYNECOLOGY

## 2022-11-23 PROCEDURE — 76820 UMBILICAL ARTERY ECHO: CPT | Mod: 26 | Performed by: OBSTETRICS & GYNECOLOGY

## 2022-11-23 PROCEDURE — 76815 OB US LIMITED FETUS(S): CPT | Mod: 26 | Performed by: OBSTETRICS & GYNECOLOGY

## 2022-11-23 NOTE — PROGRESS NOTES
Discussed plan for delivery and induction 38-39 weeks  As recommended by MFM for FGR  Would like to hold of on membrane sweep until Wednesday next week  And plan for induction after, discussed r/b/a     NST scheduled for today    Concerns: No concerns, hip pain improved over the last week.  Need prescription for breast pump  No vaginal bleeding, LOF, contractions.  No HA, RUQ pain, N/V, visual changes.  Discussed indications, risks, complications and failure rate of inductions.  Discussed signs of labor and when to call or come in.  Discussed kick counts and fetal movement.  Labor precautions discussed.  RTC 1 week.      MIKE LuceroN, RN, FNP student    Physician Attestation   I, Melanie Moser, saw this patient and have discussed and personally reviewed information outlined in this document.    I agree with the findings and plan of care as documented in the note.      MD Melanie Horn MD

## 2022-11-23 NOTE — PROGRESS NOTES
"Please see \"Imaging\" tab under Chart Review for full details.    Mela Garcia MD  Maternal Fetal Medicine    "

## 2022-11-28 ENCOUNTER — TELEPHONE (OUTPATIENT)
Dept: FAMILY MEDICINE | Facility: CLINIC | Age: 27
End: 2022-11-28

## 2022-11-28 NOTE — TELEPHONE ENCOUNTER
Order/Referral Request    Who is requesting: Patient     Orders being requested: breast pump     Reason service is needed/diagnosis: Pt requesting order for breast pump. Please look into request and order if applicable. Thank you.     When are orders needed by: At your earliest convenient.    Has this been discussed with Provider: Yes- Per pt discuss with MA at 11/23/22 visit.     Does patient have a preference on a Group/Provider/Facility? [facility]     Does patient have an appointment scheduled?: No    Where to send orders: N/A    Could we send this information to you in Urvew or would you prefer to receive a phone call?:   Patient would prefer a phone call   Okay to leave a detailed message?: Yes at Home number on file [phone] (home)

## 2022-11-29 NOTE — TELEPHONE ENCOUNTER
Writer printed DME order and faxed to [facility] Location. Writer spoke to pt regarding message below. Provider message relayed pt.    Pt verbalizes understanding, agrees with plan and has no further questions.    Closing encounter.    MIKE WickN, RN   [facility]

## 2022-11-30 ENCOUNTER — PRENATAL OFFICE VISIT (OUTPATIENT)
Dept: FAMILY MEDICINE | Facility: CLINIC | Age: 27
End: 2022-11-30
Payer: COMMERCIAL

## 2022-11-30 ENCOUNTER — OFFICE VISIT (OUTPATIENT)
Dept: MATERNAL FETAL MEDICINE | Facility: HOSPITAL | Age: 27
End: 2022-11-30
Attending: OBSTETRICS & GYNECOLOGY
Payer: COMMERCIAL

## 2022-11-30 ENCOUNTER — TRANSFERRED RECORDS (OUTPATIENT)
Dept: HEALTH INFORMATION MANAGEMENT | Facility: CLINIC | Age: 27
End: 2022-11-30

## 2022-11-30 ENCOUNTER — ANCILLARY PROCEDURE (OUTPATIENT)
Dept: ULTRASOUND IMAGING | Facility: HOSPITAL | Age: 27
End: 2022-11-30
Attending: OBSTETRICS & GYNECOLOGY
Payer: COMMERCIAL

## 2022-11-30 VITALS
BODY MASS INDEX: 31.65 KG/M2 | RESPIRATION RATE: 20 BRPM | OXYGEN SATURATION: 98 % | SYSTOLIC BLOOD PRESSURE: 103 MMHG | HEIGHT: 65 IN | HEART RATE: 89 BPM | DIASTOLIC BLOOD PRESSURE: 69 MMHG | WEIGHT: 190 LBS

## 2022-11-30 DIAGNOSIS — O36.5990 FETAL GROWTH RESTRICTION ANTEPARTUM: Primary | ICD-10-CM

## 2022-11-30 DIAGNOSIS — O09.93 SUPERVISION OF HIGH RISK PREGNANCY IN THIRD TRIMESTER: ICD-10-CM

## 2022-11-30 DIAGNOSIS — O36.5990 FETAL GROWTH RESTRICTION ANTEPARTUM: ICD-10-CM

## 2022-11-30 PROCEDURE — 99207 PR NO CHARGE LOS: CPT | Performed by: OBSTETRICS & GYNECOLOGY

## 2022-11-30 PROCEDURE — 76816 OB US FOLLOW-UP PER FETUS: CPT | Mod: 26 | Performed by: OBSTETRICS & GYNECOLOGY

## 2022-11-30 PROCEDURE — 59025 FETAL NON-STRESS TEST: CPT

## 2022-11-30 PROCEDURE — 99207 PR PRENATAL VISIT: CPT | Performed by: FAMILY MEDICINE

## 2022-11-30 PROCEDURE — U0003 INFECTIOUS AGENT DETECTION BY NUCLEIC ACID (DNA OR RNA); SEVERE ACUTE RESPIRATORY SYNDROME CORONAVIRUS 2 (SARS-COV-2) (CORONAVIRUS DISEASE [COVID-19]), AMPLIFIED PROBE TECHNIQUE, MAKING USE OF HIGH THROUGHPUT TECHNOLOGIES AS DESCRIBED BY CMS-2020-01-R: HCPCS | Performed by: FAMILY MEDICINE

## 2022-11-30 PROCEDURE — U0005 INFEC AGEN DETEC AMPLI PROBE: HCPCS | Performed by: FAMILY MEDICINE

## 2022-11-30 PROCEDURE — 59025 FETAL NON-STRESS TEST: CPT | Mod: 26 | Performed by: OBSTETRICS & GYNECOLOGY

## 2022-11-30 PROCEDURE — 76820 UMBILICAL ARTERY ECHO: CPT

## 2022-11-30 PROCEDURE — 76820 UMBILICAL ARTERY ECHO: CPT | Mod: 26 | Performed by: OBSTETRICS & GYNECOLOGY

## 2022-11-30 NOTE — PROGRESS NOTES
Concerns: plan  No vaginal bleeding, LOF, contractions.  No HA, RUQ pain, N/V, visual changes.  Discussed indications, risks, complications and failure rate of inductions.  Discussed signs of labor and when to call or come in.  Discussed kick counts and fetal movement.  Reportable signs and symptoms discussed.  Labor precautions discussed.   induction for tomorrow- covid swab submitted today     Melanie Moser MD

## 2022-11-30 NOTE — PROGRESS NOTES
The patient was seen for an ultrasound in the Maternal-Fetal Medicine Center at the [facility] today.  For a detailed report of the ultrasound examination, please see the ultrasound report which can be found under the imaging tab.    Danyell Baez MD  , OB/GYN  Maternal-Fetal Medicine  [phone] (Pager)

## 2022-12-01 ENCOUNTER — ANESTHESIA (OUTPATIENT)
Dept: OBGYN | Facility: HOSPITAL | Age: 27
End: 2022-12-01
Payer: COMMERCIAL

## 2022-12-01 ENCOUNTER — HOSPITAL ENCOUNTER (INPATIENT)
Facility: HOSPITAL | Age: 27
LOS: 1 days | Discharge: HOME | End: 2022-12-02
Attending: FAMILY MEDICINE | Admitting: STUDENT IN AN ORGANIZED HEALTH CARE EDUCATION/TRAINING PROGRAM
Payer: COMMERCIAL

## 2022-12-01 ENCOUNTER — ANESTHESIA EVENT (OUTPATIENT)
Dept: OBGYN | Facility: HOSPITAL | Age: 27
End: 2022-12-01
Payer: COMMERCIAL

## 2022-12-01 DIAGNOSIS — O09.93 SUPERVISION OF HIGH RISK PREGNANCY IN THIRD TRIMESTER: ICD-10-CM

## 2022-12-01 LAB
ABO/RH(D): NORMAL
ANTIBODY SCREEN: NEGATIVE
HOLD SPECIMEN: NORMAL
SARS-COV-2 RNA RESP QL NAA+PROBE: NEGATIVE
SPECIMEN EXPIRATION DATE: NORMAL
T PALLIDUM AB SER QL: NONREACTIVE

## 2022-12-01 PROCEDURE — 120N000001 HC R&B MED SURG/OB

## 2022-12-01 PROCEDURE — 250N000009 HC RX 250: Performed by: STUDENT IN AN ORGANIZED HEALTH CARE EDUCATION/TRAINING PROGRAM

## 2022-12-01 PROCEDURE — 722N000001 HC LABOR CARE VAGINAL DELIVERY SINGLE

## 2022-12-01 PROCEDURE — 3E0R3BZ INTRODUCTION OF ANESTHETIC AGENT INTO SPINAL CANAL, PERCUTANEOUS APPROACH: ICD-10-PCS | Performed by: ANESTHESIOLOGY

## 2022-12-01 PROCEDURE — 59400 OBSTETRICAL CARE: CPT | Performed by: STUDENT IN AN ORGANIZED HEALTH CARE EDUCATION/TRAINING PROGRAM

## 2022-12-01 PROCEDURE — 36415 COLL VENOUS BLD VENIPUNCTURE: CPT | Performed by: STUDENT IN AN ORGANIZED HEALTH CARE EDUCATION/TRAINING PROGRAM

## 2022-12-01 PROCEDURE — 86850 RBC ANTIBODY SCREEN: CPT | Performed by: STUDENT IN AN ORGANIZED HEALTH CARE EDUCATION/TRAINING PROGRAM

## 2022-12-01 PROCEDURE — 258N000003 HC RX IP 258 OP 636: Performed by: STUDENT IN AN ORGANIZED HEALTH CARE EDUCATION/TRAINING PROGRAM

## 2022-12-01 PROCEDURE — 86780 TREPONEMA PALLIDUM: CPT | Performed by: STUDENT IN AN ORGANIZED HEALTH CARE EDUCATION/TRAINING PROGRAM

## 2022-12-01 PROCEDURE — 250N000013 HC RX MED GY IP 250 OP 250 PS 637: Performed by: STUDENT IN AN ORGANIZED HEALTH CARE EDUCATION/TRAINING PROGRAM

## 2022-12-01 PROCEDURE — 250N000011 HC RX IP 250 OP 636: Performed by: ANESTHESIOLOGY

## 2022-12-01 PROCEDURE — 00HU33Z INSERTION OF INFUSION DEVICE INTO SPINAL CANAL, PERCUTANEOUS APPROACH: ICD-10-PCS | Performed by: ANESTHESIOLOGY

## 2022-12-01 PROCEDURE — 370N000003 HC ANESTHESIA WARD SERVICE

## 2022-12-01 PROCEDURE — 86901 BLOOD TYPING SEROLOGIC RH(D): CPT | Performed by: STUDENT IN AN ORGANIZED HEALTH CARE EDUCATION/TRAINING PROGRAM

## 2022-12-01 PROCEDURE — 250N000011 HC RX IP 250 OP 636: Performed by: STUDENT IN AN ORGANIZED HEALTH CARE EDUCATION/TRAINING PROGRAM

## 2022-12-01 RX ORDER — PROCHLORPERAZINE 25 MG
25 SUPPOSITORY, RECTAL RECTAL EVERY 12 HOURS PRN
Status: DISCONTINUED | OUTPATIENT
Start: 2022-12-01 | End: 2022-12-01 | Stop reason: HOSPADM

## 2022-12-01 RX ORDER — CITRIC ACID/SODIUM CITRATE 334-500MG
30 SOLUTION, ORAL ORAL
Status: DISCONTINUED | OUTPATIENT
Start: 2022-12-01 | End: 2022-12-01 | Stop reason: HOSPADM

## 2022-12-01 RX ORDER — ACETAMINOPHEN 325 MG/1
650 TABLET ORAL EVERY 4 HOURS PRN
Status: DISCONTINUED | OUTPATIENT
Start: 2022-12-01 | End: 2022-12-02 | Stop reason: HOSPADM

## 2022-12-01 RX ORDER — FENTANYL/ROPIVACAINE/NS/PF 2MCG/ML-.1
PLASTIC BAG, INJECTION (ML) EPIDURAL
Status: DISCONTINUED | OUTPATIENT
Start: 2022-12-01 | End: 2022-12-01 | Stop reason: HOSPADM

## 2022-12-01 RX ORDER — OXYTOCIN/0.9 % SODIUM CHLORIDE 30/500 ML
340 PLASTIC BAG, INJECTION (ML) INTRAVENOUS CONTINUOUS PRN
Status: DISCONTINUED | OUTPATIENT
Start: 2022-12-01 | End: 2022-12-01 | Stop reason: HOSPADM

## 2022-12-01 RX ORDER — FENTANYL CITRATE 50 UG/ML
100 INJECTION, SOLUTION INTRAMUSCULAR; INTRAVENOUS
Status: DISCONTINUED | OUTPATIENT
Start: 2022-12-01 | End: 2022-12-01 | Stop reason: HOSPADM

## 2022-12-01 RX ORDER — OXYTOCIN 10 [USP'U]/ML
10 INJECTION, SOLUTION INTRAMUSCULAR; INTRAVENOUS
Status: DISCONTINUED | OUTPATIENT
Start: 2022-12-01 | End: 2022-12-02 | Stop reason: HOSPADM

## 2022-12-01 RX ORDER — DOCUSATE SODIUM 100 MG/1
100 CAPSULE, LIQUID FILLED ORAL DAILY
Status: DISCONTINUED | OUTPATIENT
Start: 2022-12-01 | End: 2022-12-02 | Stop reason: HOSPADM

## 2022-12-01 RX ORDER — OXYTOCIN 10 [USP'U]/ML
10 INJECTION, SOLUTION INTRAMUSCULAR; INTRAVENOUS
Status: DISCONTINUED | OUTPATIENT
Start: 2022-12-01 | End: 2022-12-01 | Stop reason: HOSPADM

## 2022-12-01 RX ORDER — ONDANSETRON 4 MG/1
4 TABLET, ORALLY DISINTEGRATING ORAL EVERY 6 HOURS PRN
Status: DISCONTINUED | OUTPATIENT
Start: 2022-12-01 | End: 2022-12-01 | Stop reason: HOSPADM

## 2022-12-01 RX ORDER — OXYTOCIN/0.9 % SODIUM CHLORIDE 30/500 ML
100-340 PLASTIC BAG, INJECTION (ML) INTRAVENOUS CONTINUOUS PRN
Status: DISCONTINUED | OUTPATIENT
Start: 2022-12-01 | End: 2022-12-02 | Stop reason: HOSPADM

## 2022-12-01 RX ORDER — NALOXONE HYDROCHLORIDE 0.4 MG/ML
0.2 INJECTION, SOLUTION INTRAMUSCULAR; INTRAVENOUS; SUBCUTANEOUS
Status: DISCONTINUED | OUTPATIENT
Start: 2022-12-01 | End: 2022-12-01 | Stop reason: HOSPADM

## 2022-12-01 RX ORDER — PROCHLORPERAZINE MALEATE 10 MG
10 TABLET ORAL EVERY 6 HOURS PRN
Status: DISCONTINUED | OUTPATIENT
Start: 2022-12-01 | End: 2022-12-01 | Stop reason: HOSPADM

## 2022-12-01 RX ORDER — ONDANSETRON 2 MG/ML
4 INJECTION INTRAMUSCULAR; INTRAVENOUS EVERY 6 HOURS PRN
Status: DISCONTINUED | OUTPATIENT
Start: 2022-12-01 | End: 2022-12-01 | Stop reason: HOSPADM

## 2022-12-01 RX ORDER — LIDOCAINE 40 MG/G
CREAM TOPICAL
Status: DISCONTINUED | OUTPATIENT
Start: 2022-12-01 | End: 2022-12-01 | Stop reason: HOSPADM

## 2022-12-01 RX ORDER — MISOPROSTOL 200 UG/1
800 TABLET ORAL
Status: DISCONTINUED | OUTPATIENT
Start: 2022-12-01 | End: 2022-12-02 | Stop reason: HOSPADM

## 2022-12-01 RX ORDER — KETOROLAC TROMETHAMINE 30 MG/ML
30 INJECTION, SOLUTION INTRAMUSCULAR; INTRAVENOUS
Status: DISCONTINUED | OUTPATIENT
Start: 2022-12-01 | End: 2022-12-02 | Stop reason: HOSPADM

## 2022-12-01 RX ORDER — NALOXONE HYDROCHLORIDE 0.4 MG/ML
0.4 INJECTION, SOLUTION INTRAMUSCULAR; INTRAVENOUS; SUBCUTANEOUS
Status: DISCONTINUED | OUTPATIENT
Start: 2022-12-01 | End: 2022-12-01 | Stop reason: HOSPADM

## 2022-12-01 RX ORDER — HYDROCORTISONE 25 MG/G
CREAM TOPICAL 3 TIMES DAILY PRN
Status: DISCONTINUED | OUTPATIENT
Start: 2022-12-01 | End: 2022-12-02 | Stop reason: HOSPADM

## 2022-12-01 RX ORDER — IBUPROFEN 800 MG/1
800 TABLET, FILM COATED ORAL EVERY 6 HOURS PRN
Status: DISCONTINUED | OUTPATIENT
Start: 2022-12-01 | End: 2022-12-02 | Stop reason: HOSPADM

## 2022-12-01 RX ORDER — MODIFIED LANOLIN
OINTMENT (GRAM) TOPICAL
Status: DISCONTINUED | OUTPATIENT
Start: 2022-12-01 | End: 2022-12-02 | Stop reason: HOSPADM

## 2022-12-01 RX ORDER — MISOPROSTOL 200 UG/1
400 TABLET ORAL
Status: DISCONTINUED | OUTPATIENT
Start: 2022-12-01 | End: 2022-12-01 | Stop reason: HOSPADM

## 2022-12-01 RX ORDER — METOCLOPRAMIDE 10 MG/1
10 TABLET ORAL EVERY 6 HOURS PRN
Status: DISCONTINUED | OUTPATIENT
Start: 2022-12-01 | End: 2022-12-01 | Stop reason: HOSPADM

## 2022-12-01 RX ORDER — MISOPROSTOL 200 UG/1
400 TABLET ORAL
Status: DISCONTINUED | OUTPATIENT
Start: 2022-12-01 | End: 2022-12-02 | Stop reason: HOSPADM

## 2022-12-01 RX ORDER — CARBOPROST TROMETHAMINE 250 UG/ML
250 INJECTION, SOLUTION INTRAMUSCULAR
Status: DISCONTINUED | OUTPATIENT
Start: 2022-12-01 | End: 2022-12-01 | Stop reason: HOSPADM

## 2022-12-01 RX ORDER — LIDOCAINE HYDROCHLORIDE AND EPINEPHRINE 15; 5 MG/ML; UG/ML
3 INJECTION, SOLUTION EPIDURAL
Status: DISCONTINUED | OUTPATIENT
Start: 2022-12-01 | End: 2022-12-01 | Stop reason: HOSPADM

## 2022-12-01 RX ORDER — BISACODYL 10 MG
10 SUPPOSITORY, RECTAL RECTAL DAILY PRN
Status: DISCONTINUED | OUTPATIENT
Start: 2022-12-01 | End: 2022-12-02 | Stop reason: HOSPADM

## 2022-12-01 RX ORDER — METHYLERGONOVINE MALEATE 0.2 MG/ML
200 INJECTION INTRAVENOUS
Status: DISCONTINUED | OUTPATIENT
Start: 2022-12-01 | End: 2022-12-02 | Stop reason: HOSPADM

## 2022-12-01 RX ORDER — IBUPROFEN 800 MG/1
800 TABLET, FILM COATED ORAL
Status: DISCONTINUED | OUTPATIENT
Start: 2022-12-01 | End: 2022-12-02 | Stop reason: HOSPADM

## 2022-12-01 RX ORDER — METOCLOPRAMIDE HYDROCHLORIDE 5 MG/ML
10 INJECTION INTRAMUSCULAR; INTRAVENOUS EVERY 6 HOURS PRN
Status: DISCONTINUED | OUTPATIENT
Start: 2022-12-01 | End: 2022-12-01 | Stop reason: HOSPADM

## 2022-12-01 RX ORDER — EPHEDRINE SULFATE 50 MG/ML
5 INJECTION, SOLUTION INTRAMUSCULAR; INTRAVENOUS; SUBCUTANEOUS
Status: DISCONTINUED | OUTPATIENT
Start: 2022-12-01 | End: 2022-12-01 | Stop reason: HOSPADM

## 2022-12-01 RX ORDER — METHYLERGONOVINE MALEATE 0.2 MG/ML
200 INJECTION INTRAVENOUS
Status: DISCONTINUED | OUTPATIENT
Start: 2022-12-01 | End: 2022-12-01 | Stop reason: HOSPADM

## 2022-12-01 RX ORDER — NALBUPHINE HYDROCHLORIDE 10 MG/ML
2.5-5 INJECTION, SOLUTION INTRAMUSCULAR; INTRAVENOUS; SUBCUTANEOUS EVERY 6 HOURS PRN
Status: DISCONTINUED | OUTPATIENT
Start: 2022-12-01 | End: 2022-12-02 | Stop reason: HOSPADM

## 2022-12-01 RX ORDER — OXYTOCIN/0.9 % SODIUM CHLORIDE 30/500 ML
340 PLASTIC BAG, INJECTION (ML) INTRAVENOUS CONTINUOUS PRN
Status: DISCONTINUED | OUTPATIENT
Start: 2022-12-01 | End: 2022-12-02 | Stop reason: HOSPADM

## 2022-12-01 RX ORDER — MISOPROSTOL 200 UG/1
800 TABLET ORAL
Status: DISCONTINUED | OUTPATIENT
Start: 2022-12-01 | End: 2022-12-01 | Stop reason: HOSPADM

## 2022-12-01 RX ORDER — CARBOPROST TROMETHAMINE 250 UG/ML
250 INJECTION, SOLUTION INTRAMUSCULAR
Status: DISCONTINUED | OUTPATIENT
Start: 2022-12-01 | End: 2022-12-02 | Stop reason: HOSPADM

## 2022-12-01 RX ADMIN — Medication 340 ML/HR: at 04:58

## 2022-12-01 RX ADMIN — IBUPROFEN 800 MG: 800 TABLET ORAL at 19:02

## 2022-12-01 RX ADMIN — ACETAMINOPHEN 650 MG: 325 TABLET, FILM COATED ORAL at 19:02

## 2022-12-01 RX ADMIN — Medication: at 04:36

## 2022-12-01 RX ADMIN — KETOROLAC TROMETHAMINE 30 MG: 30 INJECTION, SOLUTION INTRAMUSCULAR; INTRAVENOUS at 05:57

## 2022-12-01 RX ADMIN — IBUPROFEN 800 MG: 800 TABLET ORAL at 12:07

## 2022-12-01 RX ADMIN — DOCUSATE SODIUM 100 MG: 100 CAPSULE, LIQUID FILLED ORAL at 11:10

## 2022-12-01 RX ADMIN — SODIUM CHLORIDE, POTASSIUM CHLORIDE, SODIUM LACTATE AND CALCIUM CHLORIDE 1000 ML: 600; 310; 30; 20 INJECTION, SOLUTION INTRAVENOUS at 04:02

## 2022-12-01 ASSESSMENT — ACTIVITIES OF DAILY LIVING (ADL)
ADLS_ACUITY_SCORE: 20
ADLS_ACUITY_SCORE: 20
DIFFICULTY_EATING/SWALLOWING: NO
ADLS_ACUITY_SCORE: 20
CONCENTRATING,_REMEMBERING_OR_MAKING_DECISIONS_DIFFICULTY: NO
WEAR_GLASSES_OR_BLIND: YES
ADLS_ACUITY_SCORE: 20
ADLS_ACUITY_SCORE: 20
DRESSING/BATHING_DIFFICULTY: NO
ADLS_ACUITY_SCORE: 20
DOING_ERRANDS_INDEPENDENTLY_DIFFICULTY: NO
VISION_MANAGEMENT: GLASSES
CHANGE_IN_FUNCTIONAL_STATUS_SINCE_ONSET_OF_CURRENT_ILLNESS/INJURY: NO
TOILETING_ISSUES: NO
ADLS_ACUITY_SCORE: 20
FALL_HISTORY_WITHIN_LAST_SIX_MONTHS: NO
ADLS_ACUITY_SCORE: 20
WALKING_OR_CLIMBING_STAIRS_DIFFICULTY: NO

## 2022-12-01 NOTE — ANESTHESIA PROCEDURE NOTES
"Epidural catheter Procedure Note    Pre-Procedure   Staff -        Anesthesiologist:  Collins Edwards MD       Performed By: anesthesiologist       Location: OB       Procedure Start/Stop Times: 12/1/2022 4:20 AM and 12/1/2022 4:38 AM       Pre-Anesthestic Checklist: patient identified, IV checked, risks and benefits discussed, informed consent, monitors and equipment checked, pre-op evaluation, at physician/surgeon's request and post-op pain management  Timeout:       Correct Patient: Yes        Correct Procedure: Yes        Correct Site: Yes        Correct Position: Yes   Procedure Documentation  Procedure: epidural catheter       Patient Position: sitting       Patient Prep/Sterile Barriers: sterile gloves, mask, patient draped       Skin prep: Chloraprep       Insertion Site: T12-L1. (midline approach).       Technique: LORT air        KISHOR at 5 cm.       Needle Type: [employer]       Needle Gauge: 18.        Needle Length (Inches): 3.5        Catheter: 20 G.          Catheter threaded easily.         7 cm epidural space.         Threaded 12 cm at skin.         # of attempts: 1 and  # of redirects:  1    Assessment/Narrative         Paresthesias: No.       Test dose of 3 mL lidocaine 1.5% w/ 1:200,000 epinephrine at.         Test dose negative, 3 minutes after injection, for signs of intravascular, subdural, or intrathecal injection.       Insertion/Infusion Method: LORT air       Aspiration negative for Heme or CSF via Epidural Catheter.    Medication(s) Administered   Medication Administration Time: 12/1/2022 4:20 AM      FOR [facility] ([facility]) ONLY:   Pain Team Contact information: please page the Pain Team Via [employer]. Search \"Pain\". During daytime hours, please page the attending first. At night please page the resident first.    "

## 2022-12-01 NOTE — L&D DELIVERY NOTE
OB Vaginal Delivery Note    Lizbeth Ramos MRN# [id number]   Age: 27 year old YOB: 1995       GA: 39w2d  GP:   Labor Complications: None   EBL: 20  mL  Delivery QBL:    Delivery Type: Vaginal, Spontaneous   ROM to Delivery Time: rupture date or rupture time have not been documented   Weight:     1 Minute 5 Minute 10 Minute   Apgar Totals:            MARY DONOVAN;SAY TERRAZAS     Delivery Details:  Lizbeth Ramos, a 27 year old  female presented with contractions. She progressed to complete. SROM at delivery. She delivered a viable infant with apgars of 9   and 9  . Patient was fully dilated and pushing after   hours   minutes in active labor. Delivery was via vaginal, spontaneous  to a sterile field under epidural  anesthesia. Infant delivered in vertex  middle  occiput  anterior  position. Anterior and posterior shoulders delivered without difficulty. The cord was clamped, cut twice and   were noted. Cord blood was obtained in routine fashion with the following disposition: hospital discard  .      Cord complications: none   Placenta delivered at 2022  5:01 AM . Placental disposition was intact  . Fundal massage performed and fundus found to be firm.     Episiotomy: none    Perineum, vagina, cervix were inspected, and the following lacerations were noted:   Perineal lacerations: none   periurethral laceration: right             No lacerations were repaired.    Excellent hemostasis was noted. Needle count correct. Infant and patient in delivery room in good and stable condition.        Richard, Female-Lizbeth [[id number]]    Augmentation: None     Delivery/Placenta Date and Time    Delivery Date: 22 Delivery Time:  4:54 AM   Placenta Date/Time: 2022  5:01 AM  Oxytocin given at the time of delivery: after delivery of baby  Delivering clinician: Alethea Whitney DO   Other personnel present at delivery:  Provider Role   Mary Donovan, Say Garcia, ERIS           Vaginal Counts     Initial count performed by 2 team members:  Two Team Members   Dr Chelo Hodges, RN       [place] Suture Needles Sponges (RETIRED) Instruments   Initial counts 0 0 5    Added to count       Relief counts       Final counts 0 0 5          Placed during labor Accounted for at the end of labor   FSE NA    IUPC NA    Cervidil NA               Final count performed by 2 team members:  Two Team Members   Dr Chelo Leal, RN      Final count correct?: Yes     Cord    Cord Complications: None               Stem cell collection?: Yes, No       Labor Events and Shoulder Dystocia    Fetal Tracing Prior to Delivery: Category 1  Shoulder dystocia present?: Neg     Delivery (Maternal) (Provider to Complete) (399536)    Episiotomy: None  Perineal lacerations: None    Periurethral laceration: right Repaired?: No   Est. blood loss (mL): 20  Repair suture: None  Genital tract inspection done: Pos     Blood Loss  Mother: Lizbeth Ramos N #[id number]   Start of Mother's Information    Delivery Blood Loss  11/30/22 1654 - 12/01/22 0510    EBL (mL) Hospital Encounter 20 mL    Total  20 mL         End of Mother's Information  Mother: Lizbeth Ramos N #[id number]          Delivery - Provider to Complete ([id number])    Delivering clinician: Alethea Whitney DO  Delivery Type (Choose the 1 that will go to the Birth History): Vaginal, Spontaneous                   Other personnel:  Provider Role   Alyson Leal RN Schwantz, Tara, RN                 Placenta    Date/Time: 12/1/2022  5:01 AM  Removal: Spontaneous           Anesthesia    Method: Epidural  Cervical dilation at placement: 8-10                Presentation and Position    Presentation: Vertex    Position: Middle Occiput Anterior                 Alethea Whitney DO

## 2022-12-01 NOTE — LACTATION NOTE
"This note was copied from a baby's chart.  This writer met with Lizbeth to offer breastfeeding education and assistance.  This is her first breastfeeding experience, as she attempted to breastfeed her first child and had too much nipple pain.  She reports her first infant had a tight frenulum under the tongue.  She pumped and bottle fed her first infant for 6 months.      This writer reviewed care plan below.  We discussed infant may struggle to sustain a deep latch as infant's fat pads are thin and she may not be able to sustain suction or remove colostrum/milk.      Education given on hand expression, the importance of optimal positioning for deep, comfortable latch and effective milk transfer, the use of breast compression to assist with milk transfer, listening for swallows, the importance of feeding baby on early hunger cues, and breastfeeding 8-12 times in 24 hours for optimal infant nutrition and hydration as well as for building an optimal milk supply.  She was encouraged to follow up at the Outpatient Lactation Clinic after discharge for any breastfeeding questions or concerns.  After education, Lizbeth was able to hand express large drops of colostrum.  Infant placed in cross cradle hold and after demonstration of latch technique x 1, Lizbeth was able to latch infant deeply onto the breast.  Lizbeth complains of \"pinching\" on nipple.  Infant unlatched and Lizbeth tried latching infant again.  Infant continues to pinch nipple; however, Lizebth reports the discomfort is better.  Infant is seen rhythmically sucking with swallows heard.  Swallows increased when breast compression used.  After 7-8 minutes, infant fell asleep and was taken off the breast.      This writer notes a tight band of tissue under infant's tongue.  Infant able to extend her tongue out over the lower gum ridge; however, when infant suck on this writer's gloved finger, infant keeps her tongue back in her mouth and \"bites\" down with her " "gums on the finger instead of keeping her tongue out over the lower gum ridge and cupping the finger.  This writer gently massaged infant's jaw joints while allowing infant to suck on gloved finger, to loosen the tight area.  Slight improvement of infant's suck.  Infant then latched onto the other breast.  Lizbeth states infant sucking better and she does not have as much of a \"pinching\" feeling on the second breast.  Again, infant is rhythmically sucking with swallows heard.  Infant only able to stay awake for 3-4 minutes.      Encouraged Lizbeth to put infant skin to skin.  Encouraged her to pump her breasts while eating lunch and to try to pump after every breastfeeding, as able, during the day hours.  Feed infant all of the expressed colostrum, as infant cues.  The extra stimulation and skin to skin will help bring Lizbeth's milk come in faster.     She thanked for the assistance and verbalizes understanding of all education given.  She denies any further questions.  Encouraged to follow up with lactation clinic, as they can assess infant's ability to transfer milk at the breast.  This writer to alert MD to assess lingual frenulum tomorrow during rounds but leaving a \"sticky note\" on infant's chart\".      Care Plan Breastfeeding Low Birth Weight Baby     May struggle to sustain a latch due to thinner fat pads in cheeks    May have decreased energy to stay at breast long enough to get enough milk    Decreased milk transfer over time will result in infant weight loss and low milk supply    Feeding Plan    Hand express, as needed    Breastfeed 8-12+ times in 24 hours    Watch for:   o Rhythmic sucking and swallowing during feeding  o Content baby after feeding  o Adequate wet & dirty diapers (per feeding log)      Supplement If infant does not latch or is sleepy at breast, end breastfeeding and feed expressed milk using the amounts below as a guideline; give more as baby cues. If necessary, make up the difference " with donor milk or formula as a bridge until milk supply increases:    o 0-24 hours 2-10 ml each feeding  o 24-48 hours 5-15 ml each feeding  o 48-72 hours 15-30 ml each feeding  o 72-96 hours 30-60 ml each feeding      Pump after feedings to stimulate milk production until milk supply well established & baby breastfeeding with rhythmic sucking and swallowing (10-20 minutes), adequate output, and weight gain.    Contact Outpatient Lactation Clinic after discharge as needed.  [phone]                  12/2021

## 2022-12-01 NOTE — PLAN OF CARE
Problem: Labor  Goal: Hemostasis  Outcome: Met  Goal: Stable Fetal Wellbeing  Outcome: Met  Goal: Effective Progression to Delivery  Outcome: Met  Goal: Absence of Infection Signs and Symptoms  Outcome: Met  Goal: Acceptable Pain Control  Outcome: Met  Goal: Normal Uterine Contraction Pattern  Outcome: Met   Pt had a  at 0454. Baby Girl. Pt had an epidural and has received Toradol after delivery. Fundus is firm and bleeding is light. Pt doing well breastfeeding. Will continue to monitor

## 2022-12-01 NOTE — ANESTHESIA PREPROCEDURE EVALUATION
Anesthesia Pre-Procedure Evaluation    Patient: Lizbeth Ramos   MRN: [id number] : 1995        Procedure : * No procedures listed *          Past Medical History:   Diagnosis Date     Depressive disorder       (normal spontaneous vaginal delivery)      Postpartum depression      Urinary tract infection       Past Surgical History:   Procedure Laterality Date     NO PAST SURGERIES        No Known Allergies   Social History     Tobacco Use     Smoking status: Never     Smokeless tobacco: Never   Substance Use Topics     Alcohol use: No      Wt Readings from Last 1 Encounters:   22 86.2 kg (190 lb)        Anesthesia Evaluation   Pt has not had prior anesthetic         ROS/MED HX  ENT/Pulmonary:  - neg pulmonary ROS     Neurologic:  - neg neurologic ROS     Cardiovascular:  - neg cardiovascular ROS     METS/Exercise Tolerance:     Hematologic:  - neg hematologic  ROS     Musculoskeletal:  - neg musculoskeletal ROS     GI/Hepatic:  - neg GI/hepatic ROS     Renal/Genitourinary:  - neg Renal ROS     Endo:  - neg endo ROS     Psychiatric/Substance Use:  - neg psychiatric ROS     Infectious Disease:       Malignancy:       Other:      (+) Possibly pregnant, ,         Physical Exam    Airway  airway exam normal           Respiratory Devices and Support         Dental  no notable dental history         Cardiovascular   cardiovascular exam normal          Pulmonary   pulmonary exam normal                OUTSIDE LABS:  CBC:   Lab Results   Component Value Date    WBC 10.4 2022    HGB 11.5 (L) 2022    HGB 12.7 2022    HCT 38.1 2022     2022     BMP:   Lab Results   Component Value Date    GLC 91 2021     COAGS: No results found for: PTT, INR, FIBR  POC:   Lab Results   Component Value Date    HCG Positive (A) 2022     HEPATIC: No results found for: ALBUMIN, PROTTOTAL, ALT, AST, GGT, ALKPHOS, BILITOTAL, BILIDIRECT, TRAVIS  OTHER:   Lab Results   Component Value Date     A1C 5.0 05/17/2022       Anesthesia Plan    ASA Status:  2      Anesthesia Type: Epidural.              Consents    Anesthesia Plan(s) and associated risks, benefits, and realistic alternatives discussed. Questions answered and patient/representative(s) expressed understanding.    - Discussed:     - Discussed with:  Patient, Spouse      - Extended Intubation/Ventilatory Support Discussed: No.         Postoperative Care    Pain management: IV analgesics, Oral pain medications.   PONV prophylaxis: Ondansetron (or other 5HT-3)     Comments:                Collins Edwards MD

## 2022-12-01 NOTE — PLAN OF CARE
Problem: Plan of Care - These are the overarching goals to be used throughout the patient stay.    Goal: Plan of Care Review  Description: The Plan of Care Review/Shift note should be completed every shift.  The Outcome Evaluation is a brief statement about your assessment that the patient is improving, declining, or no change.  This information will be displayed automatically on your shift note.  Outcome: Progressing  Flowsheets (Taken 12/1/2022 1646)  Plan of Care Reviewed With: patient     Problem: Breastfeeding  Goal: Effective Breastfeeding  Outcome: Progressing   Lizbeth Vitals are stable, she is independent with self and baby care. Requires some assistance with breastfeeding. Electric Breast pump set up . Continue to encourage her to pump every 3 hours. Veronica Galvin RN

## 2022-12-01 NOTE — ANESTHESIA POSTPROCEDURE EVALUATION
Patient: Lizbeth Ramos    Procedure: * No procedures listed *       Anesthesia Type:  Epidural    Note:  Disposition: Inpatient   Postop Pain Control: Uneventful            Sign Out: Well controlled pain   PONV: No   Neuro/Psych: Uneventful            Sign Out: Acceptable/Baseline neuro status   Airway/Respiratory: Uneventful            Sign Out: Acceptable/Baseline resp. status   CV/Hemodynamics: Uneventful            Sign Out: Acceptable CV status; No obvious hypovolemia; No obvious fluid overload   Other NRE: NONE   DID A NON-ROUTINE EVENT OCCUR? No           Last vitals:  Vitals:    12/01/22 0515 12/01/22 0518 12/01/22 0523   BP: 116/60     Resp:      Temp:      SpO2:  100% 100%       Electronically Signed By: Collins Edwards MD  December 1, 2022  5:52 AM

## 2022-12-01 NOTE — H&P
Maternal Admission H&P  [facility]  Date of Admission: 2022  Date of Service: 2022    Name      Lizbeth Ramos         1995  MRN       [id number]  PCP        Melanie Moser at [facility], [phone].    ________________________________________________________________________    Assessment and Plan:  27 year old  at 39w2d.    Baby AGA. Anticipate .    Group B strep: negative    FGR: followed by MFM    May get epidural   ________________________________________________________________________    Chief Complaint: active labor management.    HPI: Lizbeth Ramos is a 27 year old woman at 39w2d, based on an Estimated Date of Delivery: Dec 6, 2022. She presents with active labor. Patient having regular contractions but no fluid leaking.    Patient Active Problem List    Diagnosis Date Noted     Labor without complication 2022     Priority: Medium     Fetal growth restriction antepartum 10/31/2022     Priority: Medium     COVID-19 virus infection 2022     Priority: Medium     2nd trimester. + 22. Mild symptomatic. 3rd tri growth US needed.        Supervision of high risk pregnancy in third trimester 2022     Priority: Medium     Rubella non-immune status, antepartum 2017     Priority: Medium      OB History    Para Term  AB Living   2 1 1 0 0 1   SAB IAB Ectopic Multiple Live Births   0 0 0 0 1      # Outcome Date GA Lbr Fan/2nd Weight Sex Delivery Anes PTL Lv   2 Current            1 Term 18 39w6d 03:17 / 00:32 2.76 kg (6 lb 1.4 oz) F Vag-Spont Local, Nitrous N GILES      Name: Sadi      Apgar1: 8  Apgar5: 9     Review of Systems Negative except what is noted in HPI.   Past Medical History:   Diagnosis Date     Depressive disorder       (normal spontaneous vaginal delivery)      Postpartum depression      Urinary tract infection       Past Surgical History:   Procedure  Laterality Date     NO PAST SURGERIES     Patient has no known allergies.  Family History   Problem Relation Age of Onset     Diabetes Type 2  Mother      Cerebrovascular Disease Father      Hearing Loss Sister         from birth, has hearing aid     No Known Problems Brother      Social History     Socioeconomic History     Marital status: Single     Spouse name: Not on file     Number of children: Not on file     Years of education: Not on file     Highest education level: Not on file   Occupational History     Not on file   Tobacco Use     Smoking status: Never     Smokeless tobacco: Never   Substance and Sexual Activity     Alcohol use: No     Drug use: No     Sexual activity: Yes     Partners: Male     Birth control/protection: None   Other Topics Concern     Not on file   Social History Narrative     Not on file     Social Determinants of Health     Financial Resource Strain: Not on file   Food Insecurity: Not on file   Transportation Needs: Not on file   Physical Activity: Not on file   Stress: Not on file   Social Connections: Not on file   Intimate Partner Violence: Not on file   Housing Stability: Not on file     Prior to Admission Medication List  Medications Prior to Admission   Medication Sig Dispense Refill Last Dose     Prenatal MV-Min-Fe Fum-FA-DHA (PRENATAL MULTIVITAMIN PLUS DHA) 27-0.8-250 MG CAPS Take 1 tablet by mouth daily OK to substitute formulary equivalent 100 capsule 3 11/30/2022      Allergies  No Known Allergies  Immunization History   Administered Date(s) Administered     COVID-19 Vaccine 12+ (Pfizer 2022) 05/17/2022     COVID-19 Vaccine 18+ (Moderna) 04/21/2021, 05/28/2021     COVID-19 Vaccine Bivalent Booster 12+ (Pfizer) 10/31/2022     DTaP, Unspecified 1995, 1995, 1995, 01/20/1995, 07/31/1998     HPV Quadrivalent 06/24/2010, 11/15/2010, 09/09/2013     HepA-Adult 09/09/2013     HepB, Unspecified 03/21/1997, 04/21/1997, 10/16/1997     Influenza (IIV3) PF 10/30/2000,  11/30/2000, 12/04/2001, 10/21/2005, 10/26/2006, 11/21/2007, 11/14/2008, 10/13/2010     Influenza Intranasal Vaccine 11/06/2009     Influenza Vaccine >6 months (Alfuria,Fluzone) 10/04/2019, 10/31/2022     Influenza Vaccine, 6+MO IM (QUADRIVALENT W/PRESERVATIVES) 09/29/2017     MMR 04/04/1997, 10/16/1997, 02/01/2018     Meningococcal (Menomune ) 08/06/2007     Meningococcal ACWY (Menactra ) 08/06/2010     Pedvax-hib 1995, 1995, 04/04/1994, 03/21/1997     Poliovirus, inactivated (IPV) 1995, 1995, 01/20/1995, 04/21/1997     Td (Adult), Adsorbed 06/28/2005     Tdap (Adacel,Boostrix) 09/09/2013, 10/31/2017, 09/28/2022     Varicella 08/06/2007, 09/09/2013      Physical Exam  Patient Vitals for the past 24 hrs:   BP Temp Temp src Resp   12/01/22 0338 104/62 98.1  F (36.7  C) Oral 22     Wt Readings from Last 1 Encounters:   11/30/22 86.2 kg (190 lb)   Prepregnancy: 76.2 kg (168 lb), BMI 28.33. Total gain: 9.979 kg (22 lb) (expected gain: 7 kg (15 lb)-11.5 kg (25 lb)).    HEART: RRR, no murmur  LUNGS: CTA bilaterally  Fetal Heart Tones: Baseline 135 bpm, variability moderate (6-25 bpm), no decelerations. Reactive.  CONTRACTIONS:  regular, every 5 minutes.  CERVIX: dilation 7.5 cm , 90% effaced, soft, and +1 station.  FLUID: None noted.  Fetal Presentation: vertex.  Labs    No visits with results within 1 Day(s) from this visit.   Latest known visit with results is:   Prenatal Office Visit on 11/23/2022   Component Date Value Ref Range Status     Glucose Urine 11/23/2022 Negative  Negative mg/dL Final     Ketones Urine 11/23/2022 Negative  Negative mg/dL Final     Protein Albumin Urine 11/23/2022 Negative  Negative mg/dL Final      Group B Strep PCR   Date Value Ref Range Status   10/31/2022 Negative Negative Final     Comment:     Presumed negative for Streptococcus agalactiae (Group B Streptococcus) or the number of organisms may be below the limit of detection of the assay.      Antibody Screen    Date Value Ref Range Status   05/17/2022 Negative Negative Final     Hepatitis B Surface Antigen   Date Value Ref Range Status   05/17/2022 Nonreactive Nonreactive Final     Hemoglobin   Date Value Ref Range Status   09/28/2022 11.5 (L) 11.7 - 15.7 g/dL Final        Completed by:   Alethea Whitney DO  [facility]  12/1/2022 4:10 AM   used: Not needed.

## 2022-12-01 NOTE — PLAN OF CARE
Problem: Postpartum (Vaginal Delivery)  Goal: Optimal Pain Control and Function  Outcome: Progressing   Patient will rate pain less than or equal to 2.  Assessed pain level and administered pain medication as ordered and prn.

## 2022-12-02 ENCOUNTER — LACTATION ENCOUNTER (OUTPATIENT)
Age: 27
End: 2022-12-02

## 2022-12-02 VITALS
SYSTOLIC BLOOD PRESSURE: 103 MMHG | RESPIRATION RATE: 16 BRPM | HEART RATE: 73 BPM | TEMPERATURE: 98.2 F | DIASTOLIC BLOOD PRESSURE: 62 MMHG | OXYGEN SATURATION: 99 %

## 2022-12-02 PROCEDURE — 90707 MMR VACCINE SC: CPT | Performed by: FAMILY MEDICINE

## 2022-12-02 PROCEDURE — 90471 IMMUNIZATION ADMIN: CPT | Performed by: FAMILY MEDICINE

## 2022-12-02 PROCEDURE — 250N000011 HC RX IP 250 OP 636: Performed by: FAMILY MEDICINE

## 2022-12-02 PROCEDURE — 250N000013 HC RX MED GY IP 250 OP 250 PS 637: Performed by: STUDENT IN AN ORGANIZED HEALTH CARE EDUCATION/TRAINING PROGRAM

## 2022-12-02 PROCEDURE — 99207 PR SUBSEQUENT HOSPITAL CARE FOR MOTHER, 15 MINUTES: CPT | Performed by: FAMILY MEDICINE

## 2022-12-02 RX ORDER — IBUPROFEN 600 MG/1
600 TABLET, FILM COATED ORAL EVERY 6 HOURS PRN
Qty: 50 TABLET | Refills: 0 | Status: SHIPPED | OUTPATIENT
Start: 2022-12-02 | End: 2023-03-23

## 2022-12-02 RX ORDER — DOCUSATE SODIUM 100 MG/1
100 CAPSULE, LIQUID FILLED ORAL DAILY
Qty: 60 CAPSULE | Refills: 0 | Status: SHIPPED | OUTPATIENT
Start: 2022-12-02 | End: 2023-03-23

## 2022-12-02 RX ADMIN — ACETAMINOPHEN 650 MG: 325 TABLET, FILM COATED ORAL at 01:12

## 2022-12-02 RX ADMIN — IBUPROFEN 800 MG: 800 TABLET ORAL at 01:13

## 2022-12-02 RX ADMIN — Medication 1 G: at 08:55

## 2022-12-02 RX ADMIN — MEASLES, MUMPS, AND RUBELLA VIRUS VACCINE LIVE 0.5 ML: 1000; 12500; 1000 INJECTION, POWDER, LYOPHILIZED, FOR SUSPENSION SUBCUTANEOUS at 09:42

## 2022-12-02 RX ADMIN — ACETAMINOPHEN 650 MG: 325 TABLET, FILM COATED ORAL at 08:55

## 2022-12-02 RX ADMIN — DOCUSATE SODIUM 100 MG: 100 CAPSULE, LIQUID FILLED ORAL at 08:55

## 2022-12-02 RX ADMIN — IBUPROFEN 800 MG: 800 TABLET ORAL at 08:55

## 2022-12-02 ASSESSMENT — ACTIVITIES OF DAILY LIVING (ADL)
ADLS_ACUITY_SCORE: 20

## 2022-12-02 NOTE — PROGRESS NOTES
Outreach Nurse Note    Lizbeth ADAMS Richard  [id number]  1995    Chart reviewed, discharge follow-up plan discussed with patient's bedside RN, needs assessed. Post-delivery follow-up appointment planned in 2 and 6 weeks at [facility]. Patient is reported to have support at home and is ready to discharge today with .     Outreach nurse will continue to follow and assist with discharge planning as needed. No additional discharge needs reported at this time.

## 2022-12-02 NOTE — LACTATION NOTE
"This note was copied from a baby's chart.  This writer met briefly with Lizbeth this morning to give her 30 mm flanges for the breast pump as she reports pain with pumping.  Suction level is low but on her left nipple she has pain.  This writer was asked to assess a breastfeeding, however, this writer was at another consult and did not assess infant at the breast.  Bedside RN is an IBCLC and assessed feeding.  Lizbeth reports nipple pain with feeding and rates it a \"2\" (Pain scale 0-10 with 0 being no pain.)  We discussed softening the areolar tissue prior to latching infant, sandwiching her breast tissue to match infant's mouth and to keep infant firmly against her breast.  She admits she allows infant to fall away from the breast as the feeding progresses.  Reminded as her breast fills, she will need to spend more time prior to latching to soften the areolar tissue.  Encouraged to follow up with lactation next week.  Instructed to monitor infant's weight and if not gaining weight adequately, have frenulum assessed again.  Lizbeth verbalizes understanding of all education given.  She denies any further questions.      "

## 2022-12-02 NOTE — DISCHARGE SUMMARY
Maternal Discharge Summary  [facility]  Date of Service: 2022    Name      Lizbeth Ramos         1995  MRN       [id number]  PCP        Melanie Goins at [facility], [phone].  ________________________________________________________________________    Assessment:  Lizbeth Ramos is a 27 year old now  s/p vaginal, spontaneous  at 39w2d. Uncomplicated postpartum course.    Discharge Plan:   1. Discharge to Home. Condition at Discharge:  stable  2. Physical activity: Regular.  3. Diet:  Regular.  4. Home care nurse: declined by patient.  5. Lactation clinic appointment: ordered.  6. Contraception plan: copper IUD.  7. Follow up with Melanie Goins in 2 weeks and 6 weeks for routine postpartum care.  Future Appointments   Date Time Provider Department Center   2022  1:40 PM Melanie Moser MD [facility]      ________________________________________________________________________    Admission Date:  2022  Discharge Date:  2022    Gestational Age at Delivery: 39w2d    Principal Diagnosis: Labor and delivery  Delivery type: Vaginal, Spontaneous   Principal Problem:    Labor without complication  Active Problems:    Rubella non-immune status, antepartum     (normal spontaneous vaginal delivery)      Subjective:  Patient denies dizziness.   Pain well controlled  She had some dyspnea during pregnancy that is unchanged postpartum  Denies chest pain  Ambulating and eating.    Discharge Exam:  Patient Vitals for the past 24 hrs:   BP Temp Temp src Pulse Resp SpO2   22 0805 103/62 98.2  F (36.8  C) Oral 73 16 99 %   22 0112 104/54 98.7  F (37.1  C) Oral 74 18 97 %   22 1600 106/50 98.2  F (36.8  C) Oral 72 17 96 %   22 1500 107/47 98.9  F (37.2  C) Oral 70 18 97 %   22 1100 98/56 99  F (37.2  C) Oral 85 18 --     General - alert, comfortable  Heart - RRR, no murmurs  Lungs - CTA  bilaterally  Abdomen - fundus firm, nontender, at umbilicus  Extremities - trace edema  Admission on 12/01/2022   Component Date Value Ref Range Status     Treponema Antibody Total 12/01/2022 Nonreactive  Nonreactive Final     ABO/RH(D) 12/01/2022 B POS   Final     Antibody Screen 12/01/2022 Negative  Negative Final     SPECIMEN EXPIRATION DATE 12/01/2022 [id number]   Final     Hold Specimen 12/01/2022 [facility]   Final   Prenatal Office Visit on 11/30/2022   Component Date Value Ref Range Status     SARS CoV2 PCR 11/30/2022 Negative  Negative Final    NEGATIVE: SARS-CoV-2 (COVID-19) RNA not detected, presumed negative.      Discharge Medications: See medication reconciliation.     Completed by:   Larisa Borja MD  [facility]  12/2/2022 8:58 AM   used: Not needed.

## 2022-12-02 NOTE — PLAN OF CARE
Discharge instructions and danger signs reviewed with Lizbeth and her spouse. Education completed. AVS signed.       Problem: Postpartum (Vaginal Delivery)  Goal: Successful Maternal Role Transition  Outcome: Met

## 2022-12-28 NOTE — PROGRESS NOTES
"Assessment:   1.  Mother at four weeks postpartum, exclusively pumping  2.  Nipple tenderness related to pumping  3.  Slightly low milk supply  4.  Postpartum depression, interested in therapy    Plan:   1.  Continue pumping as you have been to have milk to offer to Gely and promote strong milk supply.  Sometimes pumping while you have some warmth on your breasts (like a heating pad or microwaved hot pack) is helpful, and gentle massage can also help release more milk.   It is more effective to pump more frequently for shorter time periods than it is to pump less often for longer:  For example, it is better to pump 6 times/day for 15 minutes each than it is to pump 3 times/day for 30 minutes.    2.  You will maximize your milk supply by pumping frequently, but if that is challenging and you choose to offer formula for some feedings to reduce the burden of pumping, that is a viable option.  3.  Consider using a 21mm flange on the right side and an 18mm flange on the left.  Use the lowest suction that allows the milk to flow--you want pumping to be comfortable.  A small flange \"cushion\" might be helpful if it seems that your nipples are between flange size--you can insert the soft cushion in a larger flange and decrease the width of it in that way.  4.  Gely needs around 22-23 oz of milk each day to grow well if she is about 8 pounds, so around 2.5 - 3 oz each feeding.    5.  Exclusive pumping can sometimes result in a whitish residue on the nipples that is uncomfortable--this could be related to the biologic differences between pumping and direct breastfeeding.  You could consider trying a probiotic that contains Lactobacillus fermentum and Lactobacillus salivarius to offset this possibility.  6.  Regarding mood, a supplement of fish oil, 1000 mg/day, can be helpful.  Aim for a supplement that contains more EPA than DHA fish oil;  You might want to consider one that says \"no fish burps\" or something similar!  " Taking breaks during the day to get outside or exercise gently can also be very helpful.  7.  Antidepressants are not contraindicated in breastfeeding, so if you feel you need that, it is an option.  You can discuss this with your maternity provider.  Given list of resources for therapy and support for postpartum depression.  8.  See your provider as planned, and follow up with lactation as needed.  [email or web address] can be used for brief questions, but it's important to know that messages are not seen Friday through . If urgent help is needed, Monday through Friday you can call [phone] and one of our lactation consultants will get the message and respond; if you need a rapid response over a weekend or holiday, it is best to call your on-call maternity or pediatric provider.  Please feel free to schedule a return visit if the concern is more detailed;  telephone visits are also an option if you don't feel you need to be seen in person.    Subjective: Lizbeth is here today with concerns about low milk supply and nipple pain when pumping.    She reports that latch was very painful in first few days, so she moved to exclusive pumping, which she did with her first child.  She is having difficulty finding comfortable flange:  Is using Spectra pump and has tried the 21mm and 18mm flange, and is currently using a flexible silicon flange (the LackTek) in an 18mm size.  Felt that larger sizes brought in too much of her areola and caused pain;  Using 18mm she continues to have some discomfort but feels it is manageable;  Notes that her nipple does contact the interior surface of the flange tunnel when pumping.  Nipples are reddened but skin is intact.  She is also finding it very challenging to pump regularly and care for  and 5 year old--tearful today about the difficulties of having two children and also doing exclusive pumping.  She does feel comfortable with her decision to exclusively pump and does not desire  direct breastfeeding.     Lizbeth is vaccinated for Covid-19 and has received boosters, including the bivalent    Hospital Course: Spontaneous labor, progressed well. Normal vaginal delivery under epidural anesthesia with uncomplicated postpartum.  Saw inpatient lactation for some nipple pain with latching and pumping.    Mother's Relevant Med/Surg History: postpartum depression    Breast Surgery: none    Breastfeeding Goals: to provide breastmilk as possible by exclusive pumping    Previous Breastfeeding Experience: second baby.  first baby for short time but stopped due to nipple pain and exclusively pumped for 6 months.    Infant's name: Gely Kitchen  Infant's bday: [birth year]  Gestational age: 39 2/7 weeks  Infant's birth weight: 5 lb 12.4 oz   Discharge weight: 5 lb 8.2 oz  Recent weights:  12/5/22 6 lb    Mode of delivery: Vaginal birth  Pediatric Provider: Dr. Melanie Moser with [facility]    Frequency and duration of feedings: every 2-3 hours by bottle, 3-4 oz each feeding  Swallows audible per mother: not at breast  Numbers of feedings in 24 hours: 8  Number urines per day: 6-8    Supplementation: with 3-4 oz each feeding, combination of expressed milk and formula  Pumping:   About 7 times/day, Yielding around 3 oz during first two pump sessions and 2 oz subsequently, about 16 oz/day.  Uses both Medela and Spectra pump with 18mm LackTek type of flange.    Objective/Physical exam:   Mother: Noticed breasts grew larger and areolas darkened during pregnancy and she noticed primary engorgement when her milk came in.  Her nipples are everted, the areola is compressible, the breast is soft and full.  Nipples are tender and slightly reddened but with no open areas.    Sore nipples: yes   EPDS: 15.  Lizbeth shares that she is currently looking for a therapist.    Pumping:  Pumped today using Spectra pump wth 18mm flexible silicon flange:    Able to release about 35 ml  in 20  minutes.  Nipples do have friction against sides of flange tunnel when pumping.  Right nipple measures about 18mm and left nipple measures about 15 mm.    The milk ejection reflex is normal and milk supply is slightly low.   Assessment of infant:  Not completed today--baby not present for visit      BP 94/58 (BP Location: Left arm, Patient Position: Sitting, Cuff Size: Adult Regular)   Pulse 72   LMP 2022 (Exact Date)   Breastfeeding Yes   OB History    Para Term  AB Living   2 2 2 0 0 2   SAB IAB Ectopic Multiple Live Births   0 0 0 0 2      # Outcome Date GA Lbr Fan/2nd Weight Sex Delivery Anes PTL Lv   2 Term 22 39w2d 04:50 / 00:04 2.62 kg (5 lb 12.4 oz) F Vag-Spont EPI N GILES      Name: DOROTHEA,FEMALE-BECKY      Apgar1: 9  Apgar5: 9   1 Term 18 39w6d 03:17 / 00:32 2.76 kg (6 lb 1.4 oz) F Vag-Spont Local, Nitrous N GILES      Name: Sadi      Apgar1: 8  Apgar5: 9       Current Outpatient Medications:      docusate sodium (COLACE) 100 MG capsule, Take 1 capsule (100 mg) by mouth daily, Disp: 60 capsule, Rfl: 0     ibuprofen (ADVIL/MOTRIN) 600 MG tablet, Take 1 tablet (600 mg) by mouth every 6 hours as needed, Disp: 50 tablet, Rfl: 0     Prenatal MV-Min-Fe Fum-FA-DHA (PRENATAL MULTIVITAMIN PLUS DHA) 27-0.8-250 MG CAPS, Take 1 tablet by mouth daily OK to substitute formulary equivalent, Disp: 100 capsule, Rfl: 3  Past Medical History:   Diagnosis Date     Depressive disorder       (normal spontaneous vaginal delivery)      Postpartum depression      Urinary tract infection      Past Surgical History:   Procedure Laterality Date     NO PAST SURGERIES       Family History   Problem Relation Age of Onset     Diabetes Type 2  Mother      Cerebrovascular Disease Father      Hearing Loss Sister         from birth, has hearing aid     No Known Problems Brother        Time spent:  Chart review/prechartin min on day of service  Face-to-face visit:   53 min   Documentation:  15 min    Total time spent on day of service: 73 min    An High, RACHELE, CNM, IBCLC

## 2022-12-29 ENCOUNTER — ALLIED HEALTH/NURSE VISIT (OUTPATIENT)
Dept: MIDWIFE SERVICES | Facility: CLINIC | Age: 27
End: 2022-12-29
Payer: COMMERCIAL

## 2022-12-29 VITALS — DIASTOLIC BLOOD PRESSURE: 58 MMHG | HEART RATE: 72 BPM | SYSTOLIC BLOOD PRESSURE: 94 MMHG

## 2022-12-29 DIAGNOSIS — O92.29 POSTPARTUM NIPPLE PAIN: Primary | ICD-10-CM

## 2022-12-29 PROCEDURE — 99215 OFFICE O/P EST HI 40 MIN: CPT | Performed by: ADVANCED PRACTICE MIDWIFE

## 2022-12-29 PROCEDURE — 99417 PROLNG OP E/M EACH 15 MIN: CPT | Performed by: ADVANCED PRACTICE MIDWIFE

## 2022-12-29 ASSESSMENT — EDINBURGH POSTNATAL DEPRESSION SCALE (EPDS)
I HAVE BEEN SO UNHAPPY THAT I HAVE HAD DIFFICULTY SLEEPING: NOT VERY OFTEN
I HAVE BEEN ABLE TO LAUGH AND SEE THE FUNNY SIDE OF THINGS: NOT QUITE SO MUCH NOW
I HAVE LOOKED FORWARD WITH ENJOYMENT TO THINGS: DEFINITELY LESS THAN I USED TO
I HAVE BEEN SO UNHAPPY THAT I HAVE BEEN CRYING: YES, QUITE OFTEN
I HAVE FELT SCARED OR PANICKY FOR NO GOOD REASON: NO, NOT AT ALL
TOTAL SCORE: 15
I HAVE BLAMED MYSELF UNNECESSARILY WHEN THINGS WENT WRONG: YES, SOME OF THE TIME
I HAVE BEEN ANXIOUS OR WORRIED FOR NO GOOD REASON: YES, VERY OFTEN
THINGS HAVE BEEN GETTING ON TOP OF ME: YES, SOMETIMES I HAVEN'T BEEN COPING AS WELL AS USUAL
THE THOUGHT OF HARMING MYSELF HAS OCCURRED TO ME: NEVER
I HAVE FELT SAD OR MISERABLE: YES, QUITE OFTEN

## 2022-12-29 NOTE — PATIENT INSTRUCTIONS
"  1.  Continue pumping as you have been to have milk to offer to Gely and promote strong milk supply.  Sometimes pumping while you have some warmth on your breasts (like a heating pad or microwaved hot pack) is helpful, and gentle massage can also help release more milk.   It is more effective to pump more frequently for shorter time periods than it is to pump less often for longer:  For example, it is better to pump 6 times/day for 15 minutes each than it is to pump 3 times/day for 30 minutes.    2.  You will maximize your milk supply by pumping frequently, but if that is challenging and you choose to offer formula for some feedings to reduce the burden of pumping, that is a viable option.  3.  Consider using a 21mm flange on the right side and an 18mm flange on the left.  Use the lowest suction that allows the milk to flow--you want pumping to be comfortable.  A small flange \"cushion\" might be helpful if it seems that your nipples are between flange size--you can insert the soft cushion in a larger flange and decrease the width of it in that way.  4.  Gely needs around 22-23 oz of milk each day to grow well if she is about 8 pounds, so around 2.5 - 3 oz each feeding.    5.  Exclusive pumping can sometimes result in a whitish residue on the nipples that is uncomfortable--this could be related to the biologic differences between pumping and direct breastfeeding.  You could consider trying a probiotic that contains Lactobacillus fermentum and Lactobacillus salivarius to offset this possibility.  6.  Regarding mood, a supplement of fish oil, 1000 mg/day, can be helpful.  Aim for a supplement that contains more EPA than DHA fish oil;  You might want to consider one that says \"no fish burps\" or something similar!  Taking breaks during the day to get outside or exercise gently can also be very helpful.  7.  Antidepressants are not contraindicated in breastfeeding, so if you feel you need that, it is an option.  You " "can discuss this with your maternity provider.  See below list of resources for therapy and support for postpartum depression.  8.  See your provider as planned, and follow up with lactation as needed.  Fluid can be used for brief questions, but it's important to know that messages are not seen Friday through Sunday. If urgent help is needed, Monday through Friday you can call [phone] and one of our lactation consultants will get the message and respond; if you need a rapid response over a weekend or holiday, it is best to call your on-call maternity or pediatric provider.  Please feel free to schedule a return visit if the concern is more detailed;  telephone visits are also an option if you don't feel you need to be seen in person.      ______________________    Nipple comfort:   Beaugen breast pump cushions:  very soft, stretchy plastic cushions that fit in pump flanges to make pumping more comfortable.  Can also help with fit if between flange sizes.  Work best with flange sizes between 21 and 27 mm.  Will reduce flange size by 2 mm, so use a larger flange if needed.  Should be replaced monthly.  No silicon.  $20/pr. Www.[employer].What the Trend.  Mayflower cushions:  similar to Beaugen, silicon    ___________________________    Breast Pumps    This is not a list of recommended pumps, nor does it imply a level of quality.  It is also not exhaustive;  there are a number of other types of pumps on the market.  These are simply pumps that are commonly used and with which we are more familiar.    Bear in mind that some home breast pumps will describe themselves as 'hospital grade\" but this is generally not the case.  A true hospital-grade pump is usually available for rental only;  purchase price is around $2000    Traditional home-use pumps available in [facility] and clinics, generally covered by insurance:    embraase S2:  Https://www.G[employer] Pinpoint/store/s2plus/  (The Spectra S1 offers a rechargeable " "battery;  some insurances allow this model or may pay part of it)    Medela Pump In Style MaxFlow  https://www.medela.us/breastfeeding/products/breast-pumps/pump-in-style-breast-pump        Other option for traditional pump (may or may not be covered by insurance)  Tana Motif  https://ConnectAndSell/breast-pumps    UniMom Opera:  fancier, has asynchronous, stronger  UniMom Zomee:  basic model        Portable Pumps (combined pump flange and milk receptacle that fits inside bra;  pump motor is compact and can often fit in pocket)  Frequently not fully covered by insurance;  check with your insurance provider.    Dydra S9:  Https://www.[employer] [facility]/store/9plus/     Baby Budclarisa:  Works with different types of flanges, which you purchase separately.  $249.    www.[employer].Desmos    Medela Freestyle Flex:       Around $250.   https://www.medela.us/breastfeeding/products/breast-pumps/freestyle-flex-double-electric-breast-pump     Lumora Stride:    https://www.Hyperic/en-us/shop/znpbd-fbgoep-nycipegvp-pump    Freemie Mangum, [place] or Apple:    Https://Metaplace/collections/frontpage    Freemie Cups are a related item;  the flange and milk receptacle fit inside the bra, and can be connected to several different types of pumps, including Medela and Spectra brands.  Need the \"closed system\" type for the Spectra pumps ($80), and the \"open system\" type ($60) for the Medela Symphony or Pump In Style.        Wearable All-in-One type pumps:     Bartow pump:  All-in-One wearable breast pump.  Fits inside bra, and uses special circular bag to contain milk.  Has 2 flange sizes only, 24 and 27. $499;  comes with trish and 48 bags to collect milk.  Bags are not re-usable.  www.Blipify     Ana pump: Container for milk is on the bottom of the pump device.  Has 2 suction levels and goes from letdown mode to expression mode automatically.  Comes with size 24 and 28 flanges, with size 21 available for " purchase if needed.  $279 for single pump (one breast), $499 for two. Comes with trish.  www.TwoTen/shop/surinder-pump      __________________    Postpartum Emotional Support/Mental Health Resources:    Postpartum Support [place]:  Https://www.[facility] System[facility].org/get_help   Helpline:  944.579.1854;  Email:  Ama@C[employer].com    [facility] Mother-Baby Program ([facility])   [place]:  116-164-HOPE:  Leave message, will call back within 2 days   Availability of multiple types of therapy programs    [facility] Services:  Postpartum mental health services offered free of charge to Medicaid clients.  Offering video visits, and some in-office or in-home visits.   Phone:  [phone]   Email:  info@[employer]   Website:  [employer]      Emergency Resource Phone Numbers for [place]:    Crisis Connection:  838.257.2366   [place] Suicide Prevention Line:  5-153-315-TALK   [facility] Crisis Program:  456.629.4535   [facility] Crisis Line:  848.587.1085   [facility] Psychiatric Services:  [phone]   [facility] Crisis Line:  [phone]

## 2023-01-05 ENCOUNTER — MEDICAL CORRESPONDENCE (OUTPATIENT)
Dept: HEALTH INFORMATION MANAGEMENT | Facility: CLINIC | Age: 28
End: 2023-01-05

## 2023-01-20 ENCOUNTER — PRENATAL OFFICE VISIT (OUTPATIENT)
Dept: FAMILY MEDICINE | Facility: CLINIC | Age: 28
End: 2023-01-20
Payer: COMMERCIAL

## 2023-01-20 ENCOUNTER — MEDICAL CORRESPONDENCE (OUTPATIENT)
Dept: HEALTH INFORMATION MANAGEMENT | Facility: CLINIC | Age: 28
End: 2023-01-20

## 2023-01-20 VITALS
TEMPERATURE: 97.8 F | WEIGHT: 165 LBS | SYSTOLIC BLOOD PRESSURE: 98 MMHG | DIASTOLIC BLOOD PRESSURE: 66 MMHG | RESPIRATION RATE: 18 BRPM | BODY MASS INDEX: 28.17 KG/M2 | OXYGEN SATURATION: 98 % | HEART RATE: 61 BPM | HEIGHT: 64 IN

## 2023-01-20 PROBLEM — O09.899 RUBELLA NON-IMMUNE STATUS, ANTEPARTUM: Status: RESOLVED | Noted: 2017-06-09 | Resolved: 2023-01-20

## 2023-01-20 PROBLEM — O09.93 SUPERVISION OF HIGH RISK PREGNANCY IN THIRD TRIMESTER: Status: RESOLVED | Noted: 2022-07-23 | Resolved: 2023-01-20

## 2023-01-20 PROBLEM — Z28.39 RUBELLA NON-IMMUNE STATUS, ANTEPARTUM: Status: RESOLVED | Noted: 2017-06-09 | Resolved: 2023-01-20

## 2023-01-20 PROBLEM — U07.1 COVID-19 VIRUS INFECTION: Status: RESOLVED | Noted: 2022-07-23 | Resolved: 2023-01-20

## 2023-01-20 PROBLEM — O36.5990 FETAL GROWTH RESTRICTION ANTEPARTUM: Status: RESOLVED | Noted: 2022-10-31 | Resolved: 2023-01-20

## 2023-01-20 PROCEDURE — 99207 PR POST PARTUM EXAM: CPT | Performed by: FAMILY MEDICINE

## 2023-01-20 ASSESSMENT — PATIENT HEALTH QUESTIONNAIRE - PHQ9
SUM OF ALL RESPONSES TO PHQ QUESTIONS 1-9: 15
SUM OF ALL RESPONSES TO PHQ QUESTIONS 1-9: 15
10. IF YOU CHECKED OFF ANY PROBLEMS, HOW DIFFICULT HAVE THESE PROBLEMS MADE IT FOR YOU TO DO YOUR WORK, TAKE CARE OF THINGS AT HOME, OR GET ALONG WITH OTHER PEOPLE: SOMEWHAT DIFFICULT

## 2023-01-20 NOTE — PROGRESS NOTES
[facility] PostPartum Visit:  Phone : None    Chief Complaint:  Chief Complaint   Patient presents with     Postpartum Care     Post partum        Assessment/Plan:  Discussed return to work/school plans.  Postpartum depression assessment and coping techniques. Fatigue and sleep strategies.  Breastfeeding and pumping as needed. Body changes and exercise/weight loss techniques.  Timing of next pregnancy and birth control prescribed as below.  Next pap smear due .      History   Sexual Activity     Sexual activity: Yes     Partners: Male     Birth control/ protection: None       Postpartum follow-up  Only concern is postpartum depression. Would like more time to consider non-hormonal options including copper IUD. Discussed considering Benefiber for intermittent constipation.     Postpartum depression  New onset. EPDS score 13 and PHQ-9 15. Is connected to services through work for therapy. Discussed creating a sleep schedule with her family to ensure she gets an adequate 4-6 hour block of sleep. Continue care with therapist and follow up in 4 weeks. Seek care for thoughts of harming self or others.      Return in about 4 weeks (around 2023) for Office Visit.    Postpartum Visit  Patient is here for a postpartum visit. She is 7 weeks postpartum following a spontaneous vaginal delivery. I have fully reviewed the prenatal and intrapartum course. The delivery was at 39 2/7 gestational weeks. Outcome: spontaneous vaginal delivery. Anesthesia: None.      Postpartum course has been hard.  Baby's course has been good. Baby is feeding by breast milk and supplemented with formula. Postpartum bleeding for 2 weeks. Period yet no. Bowel function with intermittent constipation. Bladder function is normal . Patient is not sexually active.  Next baby wait at least 1 year.   Contraception method considering is non-hormonal IUD or barrier methods. Postpartum depression screenin. WIC not  "using.  Exercise has not started.  Total Weight Gain during pregnancy 9.979 kg (22 lb) and Weight loss so far 25.  Return to work/school April.    Following concerns include: postpartum depression seeking therapy through work    Depression  Feeling a general sense of sadness and being overwhelmed   Denies suicidal ideation or thoughts of harming the baby  Has good support from family  Started EAP therapy through work  Sleep has been limited, trying to nap when baby naps  Concerned the baby is not sleeping or feeding as well due to possible thrush  \"white coat that doesn't come off when mouth is rinsed, turing bluish green\"    Pumping breast milk only  Not breastfeeding due to baby not latching  Supplementing with formula    Physical Exam:  BP 98/66 (BP Location: Right arm, Patient Position: Sitting, Cuff Size: Adult Regular)   Pulse 61   Temp 97.8  F (36.6  C) (Temporal)   Resp 18   Ht 1.626 m (5' 4\")   Wt 74.8 kg (165 lb)   LMP 02/21/2022 (Exact Date)   SpO2 98%   BMI 28.32 kg/m   Body mass index is 28.32 kg/m .  Vital signs reviewed    Wt Readings from Last 3 Encounters:   01/20/23 74.8 kg (165 lb)   11/30/22 86.2 kg (190 lb)   11/23/22 83.5 kg (184 lb)     Answers for HPI/ROS submitted by the patient on 1/20/2023  If you checked off any problems, how difficult have these problems made it for you to do your work, take care of things at home, or get along with other people?: Somewhat difficult  PHQ9 TOTAL SCORE: 15      EPDS Score: 13    Physical Exam:  All normal as below except abnormalities include: tearful during interview, otherwise normal.  General is a  27 year old female sitting comfortably in no apparent distress.   HEENT:  TM are clear bilaterally.  Eye, nasal, oral exams within normal   Neck: Supple without lymphadenopathy or thyromegally  CV: Regular rate and rhythm S1S2 without rubs, murmurs or gallops,   Lungs: Clear to auscultation bilaterally  Abd:  +BS, soft NT/ND,  No masses or " organomegally,   Diastasis 1 fb  Extremities: Warm, No Edema, 2+ Pedal and radial pulses bilaterally  Skin: No lesions or rashes noted  Neuro: Able to ambulate around the exam room with equal movement, strength and normal coordination of the upper and lower extremeties symmetrically      MIKE LuceroN, RN, DNP student    Physician Attestation   I, Melanie Moser, saw this patient and have discussed and personally reviewed information outlined in this document.    I agree with the findings and plan of care as documented in the note.      MD Melanie Horn MD  [facility]

## 2023-02-01 ENCOUNTER — MEDICAL CORRESPONDENCE (OUTPATIENT)
Dept: HEALTH INFORMATION MANAGEMENT | Facility: CLINIC | Age: 28
End: 2023-02-01

## 2023-02-22 ENCOUNTER — VIRTUAL VISIT (OUTPATIENT)
Dept: FAMILY MEDICINE | Facility: CLINIC | Age: 28
End: 2023-02-22
Payer: COMMERCIAL

## 2023-02-22 PROCEDURE — 99212 OFFICE O/P EST SF 10 MIN: CPT | Mod: 95 | Performed by: FAMILY MEDICINE

## 2023-02-22 NOTE — PROGRESS NOTES
Lizbeth is a 27 year old who is being evaluated via a billable telephone visit.      What phone number would you like to be contacted at? [phone]   How would you like to obtain your AVS? Mail a copy    Distant Location (provider location):  On-site    [facility] [address] Visit  Phone : none    Assessment/Plan:  Postpartum depression  Doing well with cognitive support.      Lactating mother- lactation consult ordered to help with production/milk supply with pumping and return to work in April.       Return in about 1 year (around 2/22/2024) for Health Maintenance Visit.      Subjective   Lizbeth is a 27 year old accompanied by her self, presenting for the following health issues:  post partum despression     Has been doing a couple sessions with a therapist and really helpful.  Started a new sleep routine and can get up to 4 hours in a row of sleep.  Napping in the day helps.  Mental health is doing better.  Baby is growing better.  Thrush isn't resolved yet.  Baby is having a hard time taking a bottle this week.  Fewer days that are overwhelming- most days are neutral.  Breastfeeding going well- pumping and bottling breast milk.      History of Present Illness       Reason for visit:  F/u on post partum depression    She eats 2-3 servings of fruits and vegetables daily.She consumes 1 sweetened beverage(s) daily.   She is taking medications regularly.         Review of Systems       Objective           Vitals:  No vitals were obtained today due to virtual visit.    Physical Exam   healthy, alert and no distress  PSYCH: Alert and oriented times 3; coherent speech, normal   rate and volume, able to articulate logical thoughts, able   to abstract reason, no tangential thoughts, no hallucinations   or delusions  Her affect is normal  RESP: No cough, no audible wheezing, able to talk in full sentences  Remainder of exam unable to be completed due to telephone visits            Phone call  duration: 13 minutes

## 2023-02-27 NOTE — PROGRESS NOTES
"Assessment:   1.  Mother at 3 months postpartum, exclusively pumping  2.  Milk supply slightly below baby's needs (about 5 oz below)  3.  Return to work concerns  4.  Postpartum depression  5.  Infant with good weight gain  6.  Infant with mild thrush at back of tongue    Plan:   1.  When pumping, consider staying with your current schedule with just one middle of the night pumping, but reducing the time you spend pumping at each session--to around 15 min instead of 30-40 min.  This will increase your sleep and make sustaining regular pumping more possible as you return to work and have less time.  2.  Sometimes stress about milk flow can decrease your letdown reflex and reduce milk amounts--when pumping consider covering the pump bottles and trying to distract yourself with thoughts of something other than pumping can make letdown easier and help more milk be released. Continue the heat and massage if it is useful and possible--if it feels like extra work, then it's OK to let that go.  3.  For your nightime pumping, if you  will use that milk in less than four hours, you can just leave it at your bedside and not get up to move it to the fridge.  4.  If decreasing the time of each pump session works for you and is helpful, sustaining pumping may be easier. If not, you can certainly drop one or two pumping sessions and use formula instead--mixed feeding is a very reasonable option if frequent pumping is too stressful and difficult.  Your parenting is not measured in how many ounces of milk you provide.  5.  You can also consider adding a dietary supplement, such as moringa, fenugreek, or goat's rue to support your milk supply.  6.  Continue with therapy for depression/anxiety.  Adding a supplement of fish oil, 1000 mg/day, can be helpful as well.  Aim for a supplement that contains more EPA than DHA fish oil;  You might want to consider one that says \"no fish burps\" or something similar!  Know that medications " Spoke with pt and Ellie, sister on a 3 way call.  She states she was putting louie lights up less than 2 wks ago and states she opened the door to bring in rest of Ansira items and states \" the screen door hit me in the right side of my temple.\"     She states since then the right eye and temple area is swollen and bruised and states \" it's black and blue and I have blurry vision\" and she notes wearing glasses and notes not having glasses on at the time of injury.   She states she has used ice and states it has taken some of the swelling down. She reports having left eye blurry vision and denies right eye blurry vision.   She states she can not read due to blurry vision in the left eye.  She denies feeling dizziness.     Advise making appt with PCP. Both understood and agreed.   No further questions or concerns at this time.    for depression and anxiety are OK when breastfeeding (Zoloft is usually the first medication tried), so if you are feeling that you are having continued difficulty, discuss this option with Dr. Moser.   7. If you are concerned about Gely's head shape, Dr. Moser may have suggestions, or you could ask about a referral for physical therapy.  Continue to give the Nystatin as planned, and target it towards the back of her tongue.  8.  See pediatric provider as planned, and lactation as needed.  ColibrÃ­hart can be used for brief questions, but it's important to know that messages are not seen Friday through Sunday. If urgent help is needed, Monday through Friday you can call [phone] and one of our lactation consultants will get the message and respond; if you need a rapid response over a weekend or holiday, it is best to call your on-call maternity or pediatric provider.  Please feel free to schedule a return visit if the concern is more detailed;  telephone visits are also an option if you don't feel you need to be seen in person.      Subjective: Lizbeth is here today for a follow up visit, referred by Dr. Melanie Moser.  She is exclusively pumping, and was initially seen about 2 months ago with concerns about nipple pain related to pumping as well as low milk supply--was able to pump about 16 oz/day.  Also was struggling with postpartum depression. Today Lizbeth reports that pumping is much more comfortable than it was, although she still has some nipple tenderness.  She has changed to a different type of pump flange which is an improvement. Her milk supply has also improved somewhat, from about 16 oz/day to about 20 oz/day, but Lizbeth's  primary concern is how to manage ongoing pumping when she returns to work in about a month.  She feels that when she returns to work she will not be able to pump as often as she needs to in order to maintain her supply at this level.  She will be working a standard workday,  initially with several days/week from home, moving to fully in-person in May.  Also shares that she does have anxiety about pumping and her ability to release as many ounces as possible.  She has started taking supplementary lecithin, which has helped with milk flow and letdown.  Finally, temo Hong has been diagnosed with thrush which has been slow to resolve--starting another course of Nystatin soon. Feels that this is inhibiting her bottlefeeding somewhat.  Notes as well that Gely seems to be developing a flat spot on the back of her head.    If pumps 15-20 min, only gets an ounce;  Gets most milk in second half of pumping.  Does massage and heat.  Pumping anxiety is reduced that it less painful    Lizbeth is vaccinated for Covid-19 and has received boosters, including the bivalent    Hospital Course: Spontaneous labor, progressed well. Normal vaginal delivery under epidural anesthesia with uncomplicated postpartum.  Saw inpatient lactation for some nipple pain with latching and pumping.    Mother's Relevant Med/Surg History: postpartum depression    Breast Surgery: none    Breastfeeding Goals: to provide some breastmilk until at least six months of age    Previous Breastfeeding Experience: second baby.  first baby for short time but stopped due to nipple pain and exclusively pumped for 6 months.    Infant's name: Gely Kitchen  Infant's bday: [birth year]  Gestational age: 39 2/7 weeks  Infant's birth weight: 5 lb 12.4 oz   Discharge weight: 5 lb 8.2 oz  Recent weights:  12/5/22 6 lb    Mode of delivery: Vaginal birth  Pediatric Provider: Dr. Melanie Moser with [facility]    Frequency and duration of feedings: every 3 hours by bottle, 3-4 oz each feeding  Swallows audible per mother: not at breast  Numbers of feedings in 24 hours: 8  Number urines per day: 6-8  Number stools per day: 1-2, yellow/green    Supplementation: with 3-4 oz each feeding, combination of expressed  "milk and formula  Pumping:   About 7 times/day for 30-40 min, including once in the middle of the night, yielding about 20 oz/day.  Notes that if she decreases her pumping time, yield is significantly less--gets more milk volume towards the end of pumping session. Using heat and massage while pumping.  Uses primarily Spectra pump with Pumpin' Pals flange in extra-small, which is relatively comfortable.  Using mid-level suction because she feels that empties breast best. Does have some nipple tenderness after pumping.      Objective/Physical exam:   Mother: Noticed breasts grew larger and areolas darkened during pregnancy and she noticed primary engorgement when her milk came in.    Sore nipples: much improved, but not completely resolved on sides \"where the friction is\"    EPDS: 15, with \"never\" marked for thoughts of self-harm.  Lizbeth shares that she is currently seeing therapist through [employer] at work. Has been doing CBT which is helpful;  Feels that anxiety is improving and that depression \"comes and goes.\"     The milk ejection reflex may be somewhat slow, and milk supply is slightly low based on pumping output.    Assessment of infant: 58.62% Weight for age percentile   Age today: 3 months  Today's weight: 13 # 2 oz with clothing on    Baby has full flexion of arms and legs, normal tone, behavior is alert and active, respirations are normal, skin is normal, hydration is normal, jaw is normal size and alignment, palate is normal, frenulum is normal, baby can lateralize tongue, has adequate tongue lift, and tongue can protrude past bottom gum line. Upper labial frenulum is normal.    Suck exam:  Baby has strong, coordinated suck with good tongue cupping    Baby thrush: some thickened white coating at back of tongue;  None on lips/gums/cheeks  Jaundice: none     /66 (BP Location: Right arm, Patient Position: Sitting, Cuff Size: Adult Regular)   Breastfeeding Yes   OB History    Para Term  AB " Living   2 2 2 0 0 2   SAB IAB Ectopic Multiple Live Births   0 0 0 0 2      # Outcome Date GA Lbr Fan/2nd Weight Sex Delivery Anes PTL Lv   2 Term 22 39w2d 04:50 / 00:04 2.62 kg (5 lb 12.4 oz) F Vag-Spont EPI N GILES      Name: DOROTHEA,FEMALE-BECKY      Apgar1: 9  Apgar5: 9   1 Term 18 39w6d 03:17 / 00:32 2.76 kg (6 lb 1.4 oz) F Vag-Spont Local, Nitrous N GILES      Name: Sadi      Apgar1: 8  Apgar5: 9       Current Outpatient Medications:      SOYA LECITHIN PO, , Disp: , Rfl:      docusate sodium (COLACE) 100 MG capsule, Take 1 capsule (100 mg) by mouth daily (Patient not taking: Reported on 2023), Disp: 60 capsule, Rfl: 0     ibuprofen (ADVIL/MOTRIN) 600 MG tablet, Take 1 tablet (600 mg) by mouth every 6 hours as needed (Patient not taking: Reported on 2023), Disp: 50 tablet, Rfl: 0     Prenatal MV-Min-Fe Fum-FA-DHA (PRENATAL MULTIVITAMIN PLUS DHA) 27-0.8-250 MG CAPS, Take 1 tablet by mouth daily OK to substitute formulary equivalent (Patient not taking: Reported on 2023), Disp: 100 capsule, Rfl: 3  Past Medical History:   Diagnosis Date     COVID-19 virus infection 2022    2nd trimester. + 22. Mild symptomatic. 3rd tri growth US needed.      Depressive disorder       (normal spontaneous vaginal delivery)       (normal spontaneous vaginal delivery) 2022     Postpartum depression      Urinary tract infection      Past Surgical History:   Procedure Laterality Date     NO PAST SURGERIES       Family History   Problem Relation Age of Onset     Diabetes Type 2  Mother      Cerebrovascular Disease Father      Hearing Loss Sister         from birth, has hearing aid     No Known Problems Brother        Time spent:  Chart review/prechartin min on day of service  Face-to-face visit:   45 min   Documentation: 25  min   Total time spent on day of service: 75 min    An High, APRN, CNM, IBCLC

## 2023-02-28 ENCOUNTER — ALLIED HEALTH/NURSE VISIT (OUTPATIENT)
Dept: MIDWIFE SERVICES | Facility: CLINIC | Age: 28
End: 2023-02-28
Payer: COMMERCIAL

## 2023-02-28 VITALS — SYSTOLIC BLOOD PRESSURE: 108 MMHG | DIASTOLIC BLOOD PRESSURE: 66 MMHG

## 2023-02-28 DIAGNOSIS — O92.79 INSUFFICIENT LACTATION: ICD-10-CM

## 2023-02-28 PROCEDURE — 99215 OFFICE O/P EST HI 40 MIN: CPT | Performed by: ADVANCED PRACTICE MIDWIFE

## 2023-02-28 PROCEDURE — 99417 PROLNG OP E/M EACH 15 MIN: CPT | Performed by: ADVANCED PRACTICE MIDWIFE

## 2023-02-28 ASSESSMENT — EDINBURGH POSTNATAL DEPRESSION SCALE (EPDS)
I HAVE BEEN ANXIOUS OR WORRIED FOR NO GOOD REASON: HARDLY EVER
I HAVE BEEN SO UNHAPPY THAT I HAVE HAD DIFFICULTY SLEEPING: YES, SOMETIMES
I HAVE LOOKED FORWARD WITH ENJOYMENT TO THINGS: DEFINITELY LESS THAN I USED TO
THINGS HAVE BEEN GETTING ON TOP OF ME: YES, SOMETIMES I HAVEN'T BEEN COPING AS WELL AS USUAL
I HAVE BEEN SO UNHAPPY THAT I HAVE BEEN CRYING: ONLY OCCASIONALLY
I HAVE BEEN ABLE TO LAUGH AND SEE THE FUNNY SIDE OF THINGS: DEFINITELY NOT SO MUCH NOW
I HAVE BLAMED MYSELF UNNECESSARILY WHEN THINGS WENT WRONG: NOT VERY OFTEN
I HAVE FELT SCARED OR PANICKY FOR NO GOOD REASON: YES, SOMETIMES
THE THOUGHT OF HARMING MYSELF HAS OCCURRED TO ME: NEVER
TOTAL SCORE: 15
I HAVE FELT SAD OR MISERABLE: YES, QUITE OFTEN

## 2023-02-28 NOTE — PATIENT INSTRUCTIONS
"  1.  When pumping, consider staying with your current schedule with just one middle of the night pumping, but reducing the time you spend pumping at each session--to around 15 min instead of 30-40 min.  This will increase your sleep and make sustaining regular pumping more possible as you return to work and have less time.  2.  Sometimes stress about milk flow can decrease your letdown reflex and reduce milk amounts--when pumping consider covering the pump bottles and trying to distract yourself with thoughts of something other than pumping can make letdown easier and help more milk be released. Continue the heat and massage if it is useful and possible--if it feels like extra work, then it's OK to let that go.  3.  For your nightime pumping, if you  will use that milk in less than four hours, you can just leave it at your bedside and not get up to move it to the fridge.  4.  If decreasing the time of each pump session works for you and is helpful, sustaining pumping may be easier. If not, you can certainly drop one or two pumping sessions and use formula instead--mixed feeding is a very reasonable option if frequent pumping is too stressful and difficult.  Your parenting is not measured in how many ounces of milk you provide.  5.  You can also consider adding a dietary supplement, such as moringa, fenugreek, or goat's rue to support your milk supply.  6.  Continue with therapy for depression/anxiety.  Adding a supplement of fish oil, 1000 mg/day, can be helpful as well.  Aim for a supplement that contains more EPA than DHA fish oil;  You might want to consider one that says \"no fish burps\" or something similar!  Know that medications for depression and anxiety are OK when breastfeeding (Zoloft is usually the first medication tried), so if you are feeling that you are having continued difficulty, discuss this option with Dr. Moser.   7. If you are concerned about Gely's head shape, Dr. Moser may have " "suggestions, or you could ask about a referral for physical therapy.  Continue to give the Nystatin as planned, and target it towards the back of her tongue.  8.  See pediatric provider as planned, and lactation as needed.  Qriket can be used for brief questions, but it's important to know that messages are not seen Friday through Sunday. If urgent help is needed, Monday through Friday you can call [phone] and one of our lactation consultants will get the message and respond; if you need a rapid response over a weekend or holiday, it is best to call your on-call maternity or pediatric provider.  Please feel free to schedule a return visit if the concern is more detailed;  telephone visits are also an option if you don't feel you need to be seen in person.      ______________________    This is some good information on making your milk flow more easily (helping the letdown reflex)    [email or web address]      -------------------------------------------------------------------------------------------------  Information for breastfeeding families on Increasing breastmilk supply when pumping    Frequent stimulation of the breasts, by hand expression or by using a breast pump during the first few days and weeks, is essential to establish an abundant breastmilk supply. If you find your milk supply is low, try the following recommendations. If you are consistent you will likely see an improvement within a few days.     More breast stimulation  Remember that more shorter, more frequent pumping sessions are more effective than fewer, longer sessions  Try to get in \"one more pumping\" before you go to sleep  Empty your breasts well by massaging while pumping:  \"hands-on pumping\" has been research-proven to result in more milk    Avoid these things that are known to reduce breastmilk supply  Smoking  Caffeine  Birth control pills and injections  Decongestants, antihistamines  Severe " weight loss diets  Mints, parsley, giovanna in excessive amounts    Pumping Ideas  Consider use of a hospital grade breast pump with a double kit  Rest 10-15 minutes prior to pumping, and eat and drink something  Apply warmth to your breasts and massage before beginning to pump  Do hand expression after pumping. This can help bring out more milk, as well as offer extra breast stimulation.  .     Condition your let-down reflex when pumping  Play relaxing music  Imagine your baby, look at pictures of your baby, smell baby clothing   Watch videos of your baby  Always pump in the same quiet, relaxed place, set up a routine  Do slow, deep, relaxed breathing, relax your shoulders  Consider covering your pump bottles with a blanket, scarf or nursing cover so you don't continuously check the amount that is flowing    Mother care  Reduce stress and activity, get help  Increase fluid intake  Eat nutritious meals, continue to take prenatal vitamins  Back rubs:  they stimulate nerves that serve the breasts (central part of the spine)  Increase skin-to-skin holding time with your baby, relax together  Take a warm, bath, read,meditate, and empty your mind of tasks that need to be done    Herbs, food and supplements   These may offer help to some women, but frequent milk removal helps much more!  Supplements are not a substitute.  Irene's yeast: 3 Tablespoons daily, increase by 1/2 teaspoon daily until results are seen  Moringa (also called malunggay):  this is a leaf that is commonly eaten in [place] and [place], and has been shown in a number of studies to increase milk supply.  In [place] it is found in powder form, often sold in capsules.  It is sold under a number of brand names. A common dose is 250-350 mg three times daily.  Fenugreek:  Doses of 3-5 capsules (580-610 mg each) three times per day are commonly recommended. Avoid fenugreek if you are diabetic, hypoglycemic, asthmatic or allergic to peanuts or other legumes or  beans. Taken as directed, it may cause a faint maple body odor. That is to be expected and means that the herb is doing it's job.   Marylou:  a leaf used in [place] for helping with milk supply.  A few studies have found this to be helpful.  Several companies sell this in compounds for increasing mother's milk.  Shatavari:  a type of asparagus found in [place], also found to help with milk supply.  Available in several compounds made by different companies.  Oatmeal:  try a bowl daily.  Barley and quinoa have also been found to be helpful.  Calcium supplement:  this seems to be particularly helpful for those women who note a decrease in milk supply around their menstrual cycles.  Take 1500 mg of calcium with 750 mg of magnesium daily from midcycle through your period.  Blessed thistle, goat's rue, or other herbs or beverages such as Mother's Milk Tea taken as directed on the package. Reliable sources of herbs and herbal blends are Motherlove Herbals, Jadyn Herbs and Legendairy Milk.  Lactation cookies:  these are very expensive (often around $20 for one package of mix) and many do not contain anything more special than oatmeal, flaxseed and longoria's yeast, along with sugar and chocolate.    Keep records  It is important to keep a daily log with the number of pumping sessions, amount obtained, and 24 hour totals--this amount is more important that the pumped amount at each session. This will help you see your progress over the days.   Keep in touch with your health care provider so he/she can monitor your progress over the days and modify advice if necessary.     Potential Causes to investigate:    Low thyroid  Have you health care provider check your thyroid levels. Low thyroid can affect milk supply. If you have been taking thyroid medication, have your levels checked after delivery; you may need your medication adjusted.     Other resources: http://www.lowmilksupply.org    27 bards Hand Expression Video  :[email or web address]     Maximizing Milk Production Video:  [email or web address]

## 2023-02-28 NOTE — Clinical Note
Dr. Ehsan Gonzalez:  I saw your patient, Lizbeth Ramos,with baby Gely for [facility] at the [facility] today.  The chart is attached;  please see my note.  Please feel free to contact me with any questions.  I look forward to following this pleasant family along with you as needed.  Thank you for the referral!  An High, RACHELE, CNM, IBCLC

## 2023-03-23 ENCOUNTER — VIRTUAL VISIT (OUTPATIENT)
Dept: FAMILY MEDICINE | Facility: CLINIC | Age: 28
End: 2023-03-23
Payer: COMMERCIAL

## 2023-03-23 DIAGNOSIS — Z3A.12 12 WEEKS GESTATION OF PREGNANCY: ICD-10-CM

## 2023-03-23 PROCEDURE — 99213 OFFICE O/P EST LOW 20 MIN: CPT | Mod: 93 | Performed by: FAMILY MEDICINE

## 2023-03-23 RX ORDER — PNV NO.95/FERROUS FUM/FOLIC AC 28MG-0.8MG
1 TABLET ORAL DAILY
Qty: 100 CAPSULE | Refills: 3 | Status: SHIPPED | OUTPATIENT
Start: 2023-03-23

## 2023-03-23 ASSESSMENT — ANXIETY QUESTIONNAIRES
8. IF YOU CHECKED OFF ANY PROBLEMS, HOW DIFFICULT HAVE THESE MADE IT FOR YOU TO DO YOUR WORK, TAKE CARE OF THINGS AT HOME, OR GET ALONG WITH OTHER PEOPLE?: VERY DIFFICULT
7. FEELING AFRAID AS IF SOMETHING AWFUL MIGHT HAPPEN: MORE THAN HALF THE DAYS
1. FEELING NERVOUS, ANXIOUS, OR ON EDGE: MORE THAN HALF THE DAYS
4. TROUBLE RELAXING: SEVERAL DAYS
5. BEING SO RESTLESS THAT IT IS HARD TO SIT STILL: SEVERAL DAYS
GAD7 TOTAL SCORE: 12
7. FEELING AFRAID AS IF SOMETHING AWFUL MIGHT HAPPEN: MORE THAN HALF THE DAYS
GAD7 TOTAL SCORE: 12
2. NOT BEING ABLE TO STOP OR CONTROL WORRYING: MORE THAN HALF THE DAYS
6. BECOMING EASILY ANNOYED OR IRRITABLE: MORE THAN HALF THE DAYS
IF YOU CHECKED OFF ANY PROBLEMS ON THIS QUESTIONNAIRE, HOW DIFFICULT HAVE THESE PROBLEMS MADE IT FOR YOU TO DO YOUR WORK, TAKE CARE OF THINGS AT HOME, OR GET ALONG WITH OTHER PEOPLE: VERY DIFFICULT
3. WORRYING TOO MUCH ABOUT DIFFERENT THINGS: MORE THAN HALF THE DAYS
GAD7 TOTAL SCORE: 12

## 2023-03-23 ASSESSMENT — PATIENT HEALTH QUESTIONNAIRE - PHQ9
SUM OF ALL RESPONSES TO PHQ QUESTIONS 1-9: 14
10. IF YOU CHECKED OFF ANY PROBLEMS, HOW DIFFICULT HAVE THESE PROBLEMS MADE IT FOR YOU TO DO YOUR WORK, TAKE CARE OF THINGS AT HOME, OR GET ALONG WITH OTHER PEOPLE: VERY DIFFICULT
SUM OF ALL RESPONSES TO PHQ QUESTIONS 1-9: 14

## 2023-03-23 NOTE — PROGRESS NOTES
____________________________    Virtual Visit - Telephone Encounter  [facility] - Family Medicine  Date of Service: 3/23/2023    Subjective:  Chief Complaint   Patient presents with     pp depression f/u        23    Depression is intense and unmanagable. Now. Feeling overwhelmed by that.   Has been seeing a therapist for a couple of weeks.   Had postpartum depression with 1st child as well. It was really challenging.  Caffeine one per day.  5 hours, naps up to 3 hours.     Symptoms started a year ago when she found out she was pregnant.     Answers for HPI/ROS submitted by the patient on 3/23/2023  If you checked off any problems, how difficult have these problems made it for you to do your work, take care of things at home, or get along with other people?: Very difficult  PHQ9 TOTAL SCORE: 14  MALIK 7 TOTAL SCORE: 12  What is the reason for your visit today? : pp depression f/u  How many servings of fruits and vegetables do you eat daily?: 2-3  On average, how many sweetened beverages do you drink each day (Examples: soda, juice, sweet tea, etc.  Do NOT count diet or artificially sweetened beverages)?: 1  How many days per week do you miss taking your medication?: 0    PHQ 2023 3/23/2023   PHQ-9 Total Score 15 14   Q9: Thoughts of better off dead/self-harm past 2 weeks Not at all Not at all      MALIK-7 SCORE 3/23/2023   Total Score 12 (moderate anxiety)   Total Score 12       Objective:         Speech is normal  Patient is calm  No visits with results within 1 Week(s) from this visit.   Latest known visit with results is:   Admission on 2022, Discharged on 2022   Component Date Value Ref Range Status     Treponema Antibody Total 2022 Nonreactive  Nonreactive Final     ABO/RH(D) 2022 B POS   Final     Antibody Screen 2022 Negative  Negative Final     SPECIMEN EXPIRATION DATE [id number]   Final     Hold Specimen 2022 [facility]   Final      No results found.   Assessment & Plan:    Postpartum depression.  o Continue therapy  o Add sertraline 25 mg daily for 10 days, then increase to 50 mg daily. Discussed side effects of medication and risk of suicide. Currently breastfeeding - discussed.   o Follow up with PCP in 3 weeks.  o Labs PRN: Thyroid and iron.  o Continue prenatal vitamin  o Get 6 hours of sleep daily    Order Summary    ICD-10-CM    1. Postpartum depression  F53.0 sertraline (ZOLOFT) 50 MG tablet     TSH with free T4 reflex     Iron and iron binding capacity     PRIMARY CARE FOLLOW-UP SCHEDULING      2. Lactating mother  Z39.1       3. 12 weeks gestation of pregnancy  Z3A.12 Prenatal MV-Min-Fe Fum-FA-DHA (PRENATAL MULTIVITAMIN PLUS DHA) 27-0.8-250 MG CAPS        Future Appointments   Date Time Provider Department Center   3/23/2023  4:00 PM Jasmyn Clifford MD [facility]      Completed by: Jasmyn Clifford M.D., [facility]. 3/23/2023 3:59 PM.  This transcription uses voice recognition software, which may contain typographical errors.  Start call: 3:59 PM. End call: 4:15 PM .  Physician location: [facility], on site.  ____________________________

## 2023-03-23 NOTE — PATIENT INSTRUCTIONS
Postpartum depression  Continue therapy.  Add sertraline 25 mg daily for 10 days, then increase to 50 mg daily (when side effects subside).   Follow up with Dr. Moser in 3 weeks.  Labs (if desired): Thyroid and iron.  Continue prenatal vitamin.  Get 6 hours of sleep daily.

## 2023-04-26 ENCOUNTER — OFFICE VISIT (OUTPATIENT)
Dept: FAMILY MEDICINE | Facility: CLINIC | Age: 28
End: 2023-04-26
Payer: COMMERCIAL

## 2023-04-26 VITALS
HEIGHT: 65 IN | DIASTOLIC BLOOD PRESSURE: 65 MMHG | WEIGHT: 165 LBS | HEART RATE: 66 BPM | BODY MASS INDEX: 27.49 KG/M2 | SYSTOLIC BLOOD PRESSURE: 96 MMHG

## 2023-04-26 PROCEDURE — 99213 OFFICE O/P EST LOW 20 MIN: CPT | Performed by: FAMILY MEDICINE

## 2023-04-26 RX ORDER — SERTRALINE HYDROCHLORIDE 100 MG/1
100 TABLET, FILM COATED ORAL DAILY
Qty: 30 TABLET | Refills: 4 | Status: SHIPPED | OUTPATIENT
Start: 2023-04-26 | End: 2023-05-24

## 2023-04-26 ASSESSMENT — PATIENT HEALTH QUESTIONNAIRE - PHQ9
SUM OF ALL RESPONSES TO PHQ QUESTIONS 1-9: 16
10. IF YOU CHECKED OFF ANY PROBLEMS, HOW DIFFICULT HAVE THESE PROBLEMS MADE IT FOR YOU TO DO YOUR WORK, TAKE CARE OF THINGS AT HOME, OR GET ALONG WITH OTHER PEOPLE: VERY DIFFICULT
SUM OF ALL RESPONSES TO PHQ QUESTIONS 1-9: 16

## 2023-04-26 ASSESSMENT — ANXIETY QUESTIONNAIRES
8. IF YOU CHECKED OFF ANY PROBLEMS, HOW DIFFICULT HAVE THESE MADE IT FOR YOU TO DO YOUR WORK, TAKE CARE OF THINGS AT HOME, OR GET ALONG WITH OTHER PEOPLE?: VERY DIFFICULT
2. NOT BEING ABLE TO STOP OR CONTROL WORRYING: NEARLY EVERY DAY
1. FEELING NERVOUS, ANXIOUS, OR ON EDGE: MORE THAN HALF THE DAYS
GAD7 TOTAL SCORE: 12
7. FEELING AFRAID AS IF SOMETHING AWFUL MIGHT HAPPEN: SEVERAL DAYS
GAD7 TOTAL SCORE: 12
GAD7 TOTAL SCORE: 12
5. BEING SO RESTLESS THAT IT IS HARD TO SIT STILL: SEVERAL DAYS
3. WORRYING TOO MUCH ABOUT DIFFERENT THINGS: NEARLY EVERY DAY
6. BECOMING EASILY ANNOYED OR IRRITABLE: SEVERAL DAYS
IF YOU CHECKED OFF ANY PROBLEMS ON THIS QUESTIONNAIRE, HOW DIFFICULT HAVE THESE PROBLEMS MADE IT FOR YOU TO DO YOUR WORK, TAKE CARE OF THINGS AT HOME, OR GET ALONG WITH OTHER PEOPLE: VERY DIFFICULT
7. FEELING AFRAID AS IF SOMETHING AWFUL MIGHT HAPPEN: SEVERAL DAYS
4. TROUBLE RELAXING: SEVERAL DAYS

## 2023-04-26 NOTE — LETTER
23    RE: Lizbeth Ramos  : 1995    To Whom It May Concern:    Lizbeth Ramos continues to have postpartum complications.     She can return to work on 23 as scheduled with the following restrictions- please send me new FMLA as needed.     She will need flexible scheduling allowing her to take breaks as needed every 1-2 hours.    Ability to work from home or office as needed.     No more than 4 hours/day 5 days/week for first 2 weeks back at work.      She may need up to 1/2 day a week off due to symptoms.       Please contact me with any questions.     Sincerely,      Electronically signed by:  Melanie Moser MD

## 2023-04-26 NOTE — PROGRESS NOTES
[facility] Visit  Phone : none    Assessment/Plan:  Postpartum depression  Improving but not at goal for symptom management.  Increase sertraline to 100mg daily and follow-up as below.  Reviewed warning signs.   - sertraline (ZOLOFT) 100 MG tablet  Dispense: 30 tablet; Refill: 4  - PRIMARY CARE FOLLOW-UP SCHEDULING       Follow-up Visit   Expected date:  May 26, 2023 (Approximate)      Follow Up Appointment Details:     Follow-up with whom?: PCP    Follow-Up for what?: Chronic Disease f/u    Chronic Disease f/u: Depression    How?: In Person                    Subjective   Lizbeth is a 28 year old, presenting for the following health issues:  Depression  20-30% better  Use to be 9-10/10 and no 7/10   Did start sertraline 50mg now- noting decreased appetite n/v.    No longer over eating.    Working with therapist every 1-2 weeks     Pumping 4-5x/day so less stressed  Speech therapist helped switch to higher flow nipple  More sleep   meds are helping- turned down the volume on her depression- not screaming at her as much.  Able to hear herself more now.      Not at work yet-should be starting next week   Works 8:30-4:30   Talking with people and some flexibility  Some days at home and some days in office  Hoping to build in more breaks and spacing out her appointments and meetings.    Not ready to go back to work   Vaginal delivery 12/1/22    Father-in-law just moved in so less privacy.            4/26/2023    12:16 PM   Additional Questions   Roomed by M   Accompanied by self     History of Present Illness       Mental Health Follow-up:  Patient presents to follow-up on Depression & Anxiety.Patient's depression since last visit has been:  Medium  The patient is having other symptoms associated with depression.  Patient's anxiety since last visit has been:  Bad  The patient is having other symptoms associated with anxiety.  Any significant life events: financial concerns  Patient  "is feeling anxious or having panic attacks.  Patient has no concerns about alcohol or drug use.    She eats 2-3 servings of fruits and vegetables daily.She consumes 0 sweetened beverage(s) daily.She exercises with enough effort to increase her heart rate 9 or less minutes per day.  She exercises with enough effort to increase her heart rate 3 or less days per week.   She is taking medications regularly.    Today's PHQ-9         PHQ-9 Total Score: 16    PHQ-9 Q9 Thoughts of better off dead/self-harm past 2 weeks :   Not at all    How difficult have these problems made it for you to do your work, take care of things at home, or get along with other people: Very difficult  Today's MALIK-7 Score: 12       Review of Systems       Objective    BP 96/65 (BP Location: Left arm, Patient Position: Sitting, Cuff Size: Adult Large)   Pulse 66   Ht 1.64 m (5' 4.57\")   Wt 74.8 kg (165 lb)   Breastfeeding Yes   BMI 27.83 kg/m    Body mass index is 27.83 kg/m .  Physical Exam   Wt Readings from Last 3 Encounters:   04/26/23 74.8 kg (165 lb)   01/20/23 74.8 kg (165 lb)   11/30/22 86.2 kg (190 lb)       No LMP recorded.    All normal as below except abnormalities include: pt appears well.    General is a  28 year old sitting comfortably in no apparent distress wearing a mask     History summarized from1-2:virtual visit 3/23/23- postpartum depression 50mg sertraline started.    Old Records-1: Outside allergies, meds, problems and immunizations were reconciled as needed from [facility]  Lab tests reviewed-1: 2023    Melanie Moser MD                  "

## 2023-05-24 ENCOUNTER — OFFICE VISIT (OUTPATIENT)
Dept: FAMILY MEDICINE | Facility: CLINIC | Age: 28
End: 2023-05-24
Attending: FAMILY MEDICINE
Payer: COMMERCIAL

## 2023-05-24 VITALS
HEIGHT: 64 IN | SYSTOLIC BLOOD PRESSURE: 91 MMHG | DIASTOLIC BLOOD PRESSURE: 62 MMHG | WEIGHT: 158 LBS | HEART RATE: 73 BPM | OXYGEN SATURATION: 98 % | BODY MASS INDEX: 26.98 KG/M2

## 2023-05-24 PROCEDURE — 99213 OFFICE O/P EST LOW 20 MIN: CPT | Performed by: FAMILY MEDICINE

## 2023-05-24 RX ORDER — SERTRALINE HYDROCHLORIDE 100 MG/1
100 TABLET, FILM COATED ORAL DAILY
Qty: 90 TABLET | Refills: 3 | Status: SHIPPED | OUTPATIENT
Start: 2023-05-24 | End: 2024-03-26

## 2023-05-24 ASSESSMENT — ANXIETY QUESTIONNAIRES
GAD7 TOTAL SCORE: 3
IF YOU CHECKED OFF ANY PROBLEMS ON THIS QUESTIONNAIRE, HOW DIFFICULT HAVE THESE PROBLEMS MADE IT FOR YOU TO DO YOUR WORK, TAKE CARE OF THINGS AT HOME, OR GET ALONG WITH OTHER PEOPLE: SOMEWHAT DIFFICULT
1. FEELING NERVOUS, ANXIOUS, OR ON EDGE: SEVERAL DAYS
4. TROUBLE RELAXING: NOT AT ALL
GAD7 TOTAL SCORE: 3
2. NOT BEING ABLE TO STOP OR CONTROL WORRYING: NOT AT ALL
5. BEING SO RESTLESS THAT IT IS HARD TO SIT STILL: NOT AT ALL
8. IF YOU CHECKED OFF ANY PROBLEMS, HOW DIFFICULT HAVE THESE MADE IT FOR YOU TO DO YOUR WORK, TAKE CARE OF THINGS AT HOME, OR GET ALONG WITH OTHER PEOPLE?: SOMEWHAT DIFFICULT
7. FEELING AFRAID AS IF SOMETHING AWFUL MIGHT HAPPEN: SEVERAL DAYS
7. FEELING AFRAID AS IF SOMETHING AWFUL MIGHT HAPPEN: SEVERAL DAYS
6. BECOMING EASILY ANNOYED OR IRRITABLE: NOT AT ALL
3. WORRYING TOO MUCH ABOUT DIFFERENT THINGS: SEVERAL DAYS

## 2023-05-24 ASSESSMENT — PATIENT HEALTH QUESTIONNAIRE - PHQ9: SUM OF ALL RESPONSES TO PHQ QUESTIONS 1-9: 1

## 2023-05-24 NOTE — PROGRESS NOTES
[facility] Visit  Phone : none    Assessment/Plan:  Postpartum depression  PHQ-9 score:      5/24/2023     1:04 PM   PHQ   PHQ-9 Total Score 1   Q9: Thoughts of better off dead/self-harm past 2 weeks Not at all     Doing much better.  Continue on sertraline 100mg daily and follow-up in 6 months     - PRIMARY CARE FOLLOW-UP SCHEDULING  - PRIMARY CARE FOLLOW-UP SCHEDULING  - sertraline (ZOLOFT) 100 MG tablet  Dispense: 90 tablet; Refill: 3       Follow-up Visit   Expected date:  Nov 24, 2023 (Approximate)      Follow Up Appointment Details:     Follow-up with whom?: Me    Follow-Up for what?: Chronic Disease f/u    Chronic Disease f/u: Depression    How?: In Person or Virtual                        Subjective   Lizbeth is a 28 year old, presenting for the following health issues:  Depression f/u    Doing 80% better  Able to enjoy things and laugh more  More patient  Still some anxiety but less depression  More energy and able to get more things done  Less tired  Not pumping at night-just 3x/during day so sleeping more at night.    Started work this week and able to focus and get back to her tasks.          5/24/2023    11:15 AM   Additional Questions   Roomed by M   Accompanied by daughter     History of Present Illness       Mental Health Follow-up:  Patient presents to follow-up on Depression & Anxiety.Patient's depression since last visit has been:  Good  The patient is having other symptoms associated with depression.  Patient's anxiety since last visit has been:  Good  The patient is having other symptoms associated with anxiety.  Any significant life events: No  Patient is feeling anxious or having panic attacks.  Patient has no concerns about alcohol or drug use.   Today's MALIK-7 Score: 3     PHQ-9 score:        5/24/2023     1:04 PM   PHQ   PHQ-9 Total Score 1   Q9: Thoughts of better off dead/self-harm past 2 weeks Not at all           Review of Systems       Objective   "  BP 91/62 (BP Location: Right arm, Patient Position: Sitting, Cuff Size: Adult Large)   Pulse 73   Ht 1.62 m (5' 3.78\")   Wt 71.7 kg (158 lb)   LMP  (LMP Unknown)   SpO2 98%   Breastfeeding No   BMI 27.31 kg/m    Body mass index is 27.31 kg/m .  Physical Exam   Wt Readings from Last 3 Encounters:   05/24/23 71.7 kg (158 lb)   04/26/23 74.8 kg (165 lb)   01/20/23 74.8 kg (165 lb)       No LMP recorded (lmp unknown).    All normal as below except abnormalities include: pt appears well.  Here with her 4 year old daughter and appropriately attentive.    General is a  28 year old sitting comfortably in no apparent distress wearing a mask     Melanie Moser MD        Prior to immunization administration, verified patients identity using patient s name and date of birth. Please see Immunization Activity for additional information.     Screening Questionnaire for Adult Immunization    Are you sick today?   No   Do you have allergies to medications, food, a vaccine component or latex?   No   Have you ever had a serious reaction after receiving a vaccination?   No   Do you have a long-term health problem with heart, lung, kidney, or metabolic disease (e.g., diabetes), asthma, a blood disorder, no spleen, complement component deficiency, a cochlear implant, or a spinal fluid leak?  Are you on long-term aspirin therapy?   No   Do you have cancer, leukemia, HIV/AIDS, or any other immune system problem?   No   Do you have a parent, brother, or sister with an immune system problem?   No   In the past 3 months, have you taken medications that affect  your immune system, such as prednisone, other steroids, or anticancer drugs; drugs for the treatment of rheumatoid arthritis, Crohn s disease, or psoriasis; or have you had radiation treatments?   No   Have you had a seizure, or a brain or other nervous system problem?   No   During the past year, have you received a transfusion of blood or blood    products, or been given " immune (gamma) globulin or antiviral drug?   No   For women: Are you pregnant or is there a chance you could become       pregnant during the next month?   No   Have you received any vaccinations in the past 4 weeks?   No     Immunization questionnaire answers were all negative.      Injection of NA given by MICHELINE Nolen MA. Patient instructed to remain in clinic for 15 minutes afterwards, and to report any adverse reactions.     Screening performed by MICHELINE Nolen MA on 5/24/2023 at 11:19 AM.

## 2023-06-04 ENCOUNTER — HEALTH MAINTENANCE LETTER (OUTPATIENT)
Age: 28
End: 2023-06-04

## 2023-07-11 ENCOUNTER — MEDICAL CORRESPONDENCE (OUTPATIENT)
Dept: HEALTH INFORMATION MANAGEMENT | Facility: CLINIC | Age: 28
End: 2023-07-11
Payer: COMMERCIAL

## 2024-03-26 ENCOUNTER — MYC MEDICAL ADVICE (OUTPATIENT)
Dept: FAMILY MEDICINE | Facility: CLINIC | Age: 29
End: 2024-03-26
Payer: COMMERCIAL

## 2024-03-26 RX ORDER — SERTRALINE HYDROCHLORIDE 100 MG/1
100 TABLET, FILM COATED ORAL DAILY
Qty: 90 TABLET | Refills: 1 | Status: SHIPPED | OUTPATIENT
Start: 2024-03-26 | End: 2024-07-17

## 2024-03-26 ASSESSMENT — PATIENT HEALTH QUESTIONNAIRE - PHQ9
SUM OF ALL RESPONSES TO PHQ QUESTIONS 1-9: 3
10. IF YOU CHECKED OFF ANY PROBLEMS, HOW DIFFICULT HAVE THESE PROBLEMS MADE IT FOR YOU TO DO YOUR WORK, TAKE CARE OF THINGS AT HOME, OR GET ALONG WITH OTHER PEOPLE: SOMEWHAT DIFFICULT
SUM OF ALL RESPONSES TO PHQ QUESTIONS 1-9: 3

## 2024-03-26 NOTE — TELEPHONE ENCOUNTER
Contacted patient that Rx for Zoloft was refilled  Patient has an appointment scheduled on 7/17/24 with PCP, Dr Moser.  No further action needed.    Shea Baptiste RN  [facility]   Disp Refills Start End TUCKER   sertraline (ZOLOFT) 100 MG tablet 90 tablet 1 3/26/2024       [id number] - [place], [place] - [address]

## 2024-03-26 NOTE — TELEPHONE ENCOUNTER
Medication Question or Refill        What medication are you calling about (include dose and sig)?:   sertraline (ZOLOFT) 100 MG tablet     Preferred Pharmacy:     [id number] - [place], [place] - [address]  [address]  [place] [address]  Phone: [phone] Fax: [phone]      Controlled Substance Agreement on file:   CSA -- Patient Level:    CSA: None found at the patient level.       Who prescribed the medication?: PCP    Do you need a refill? Yes    When did you use the medication last? NA    Patient offered an appointment? Yes: 7/17/2024    Do you have any questions or concerns?  No

## 2024-07-14 ENCOUNTER — HEALTH MAINTENANCE LETTER (OUTPATIENT)
Age: 29
End: 2024-07-14

## 2024-07-16 SDOH — HEALTH STABILITY: PHYSICAL HEALTH: ON AVERAGE, HOW MANY MINUTES DO YOU ENGAGE IN EXERCISE AT THIS LEVEL?: 30 MIN

## 2024-07-16 SDOH — HEALTH STABILITY: PHYSICAL HEALTH: ON AVERAGE, HOW MANY DAYS PER WEEK DO YOU ENGAGE IN MODERATE TO STRENUOUS EXERCISE (LIKE A BRISK WALK)?: 2 DAYS

## 2024-07-16 ASSESSMENT — SOCIAL DETERMINANTS OF HEALTH (SDOH): HOW OFTEN DO YOU GET TOGETHER WITH FRIENDS OR RELATIVES?: ONCE A WEEK

## 2024-07-17 ENCOUNTER — OFFICE VISIT (OUTPATIENT)
Dept: FAMILY MEDICINE | Facility: CLINIC | Age: 29
End: 2024-07-17
Payer: COMMERCIAL

## 2024-07-17 VITALS
BODY MASS INDEX: 28.49 KG/M2 | RESPIRATION RATE: 19 BRPM | HEIGHT: 65 IN | SYSTOLIC BLOOD PRESSURE: 80 MMHG | DIASTOLIC BLOOD PRESSURE: 64 MMHG | WEIGHT: 171 LBS | HEART RATE: 79 BPM | OXYGEN SATURATION: 96 %

## 2024-07-17 DIAGNOSIS — F41.1 GENERALIZED ANXIETY DISORDER: ICD-10-CM

## 2024-07-17 DIAGNOSIS — M25.561 CHRONIC PAIN OF BOTH KNEES: ICD-10-CM

## 2024-07-17 DIAGNOSIS — M25.562 CHRONIC PAIN OF BOTH KNEES: ICD-10-CM

## 2024-07-17 DIAGNOSIS — Z00.00 ROUTINE GENERAL MEDICAL EXAMINATION AT A HEALTH CARE FACILITY: Primary | ICD-10-CM

## 2024-07-17 DIAGNOSIS — G89.29 CHRONIC PAIN OF BOTH KNEES: ICD-10-CM

## 2024-07-17 DIAGNOSIS — Z71.89 ADVANCE CARE PLANNING: ICD-10-CM

## 2024-07-17 DIAGNOSIS — M25.522 PAIN OF BOTH ELBOWS: ICD-10-CM

## 2024-07-17 DIAGNOSIS — S86.899A MEDIAL TIBIAL STRESS SYNDROME, UNSPECIFIED LATERALITY, INITIAL ENCOUNTER: ICD-10-CM

## 2024-07-17 DIAGNOSIS — M25.521 PAIN OF BOTH ELBOWS: ICD-10-CM

## 2024-07-17 LAB
BASOPHILS # BLD AUTO: 0.1 10E3/UL (ref 0–0.2)
BASOPHILS NFR BLD AUTO: 1 %
EOSINOPHIL # BLD AUTO: 0.2 10E3/UL (ref 0–0.7)
EOSINOPHIL NFR BLD AUTO: 3 %
ERYTHROCYTE [DISTWIDTH] IN BLOOD BY AUTOMATED COUNT: 12.2 % (ref 10–15)
ERYTHROCYTE [SEDIMENTATION RATE] IN BLOOD BY WESTERGREN METHOD: 33 MM/HR (ref 0–20)
HBA1C MFR BLD: 5.3 % (ref 0–5.6)
HCT VFR BLD AUTO: 43 % (ref 35–47)
HGB BLD-MCNC: 13.9 G/DL (ref 11.7–15.7)
IMM GRANULOCYTES # BLD: 0 10E3/UL
IMM GRANULOCYTES NFR BLD: 0 %
LYMPHOCYTES # BLD AUTO: 1.6 10E3/UL (ref 0.8–5.3)
LYMPHOCYTES NFR BLD AUTO: 22 %
MCH RBC QN AUTO: 29.6 PG (ref 26.5–33)
MCHC RBC AUTO-ENTMCNC: 32.3 G/DL (ref 31.5–36.5)
MCV RBC AUTO: 92 FL (ref 78–100)
MONOCYTES # BLD AUTO: 0.6 10E3/UL (ref 0–1.3)
MONOCYTES NFR BLD AUTO: 8 %
NEUTROPHILS # BLD AUTO: 4.8 10E3/UL (ref 1.6–8.3)
NEUTROPHILS NFR BLD AUTO: 65 %
PLATELET # BLD AUTO: 199 10E3/UL (ref 150–450)
RBC # BLD AUTO: 4.69 10E6/UL (ref 3.8–5.2)
WBC # BLD AUTO: 7.3 10E3/UL (ref 4–11)

## 2024-07-17 PROCEDURE — 80053 COMPREHEN METABOLIC PANEL: CPT | Performed by: FAMILY MEDICINE

## 2024-07-17 PROCEDURE — 85652 RBC SED RATE AUTOMATED: CPT | Performed by: FAMILY MEDICINE

## 2024-07-17 PROCEDURE — G0145 SCR C/V CYTO,THINLAYER,RESCR: HCPCS | Performed by: FAMILY MEDICINE

## 2024-07-17 PROCEDURE — 86039 ANTINUCLEAR ANTIBODIES (ANA): CPT | Performed by: FAMILY MEDICINE

## 2024-07-17 PROCEDURE — 83036 HEMOGLOBIN GLYCOSYLATED A1C: CPT | Performed by: FAMILY MEDICINE

## 2024-07-17 PROCEDURE — 36415 COLL VENOUS BLD VENIPUNCTURE: CPT | Performed by: FAMILY MEDICINE

## 2024-07-17 PROCEDURE — 99214 OFFICE O/P EST MOD 30 MIN: CPT | Mod: 25 | Performed by: FAMILY MEDICINE

## 2024-07-17 PROCEDURE — 87624 HPV HI-RISK TYP POOLED RSLT: CPT | Performed by: FAMILY MEDICINE

## 2024-07-17 PROCEDURE — 86038 ANTINUCLEAR ANTIBODIES: CPT | Performed by: FAMILY MEDICINE

## 2024-07-17 PROCEDURE — 99395 PREV VISIT EST AGE 18-39: CPT | Performed by: FAMILY MEDICINE

## 2024-07-17 PROCEDURE — 85025 COMPLETE CBC W/AUTO DIFF WBC: CPT | Performed by: FAMILY MEDICINE

## 2024-07-17 PROCEDURE — 86618 LYME DISEASE ANTIBODY: CPT | Performed by: FAMILY MEDICINE

## 2024-07-17 PROCEDURE — 82728 ASSAY OF FERRITIN: CPT | Performed by: FAMILY MEDICINE

## 2024-07-17 PROCEDURE — 82306 VITAMIN D 25 HYDROXY: CPT | Performed by: FAMILY MEDICINE

## 2024-07-17 PROCEDURE — 84443 ASSAY THYROID STIM HORMONE: CPT | Performed by: FAMILY MEDICINE

## 2024-07-17 ASSESSMENT — ANXIETY QUESTIONNAIRES
2. NOT BEING ABLE TO STOP OR CONTROL WORRYING: NEARLY EVERY DAY
GAD7 TOTAL SCORE: 18
GAD7 TOTAL SCORE: 18
IF YOU CHECKED OFF ANY PROBLEMS ON THIS QUESTIONNAIRE, HOW DIFFICULT HAVE THESE PROBLEMS MADE IT FOR YOU TO DO YOUR WORK, TAKE CARE OF THINGS AT HOME, OR GET ALONG WITH OTHER PEOPLE: SOMEWHAT DIFFICULT
3. WORRYING TOO MUCH ABOUT DIFFERENT THINGS: NEARLY EVERY DAY
5. BEING SO RESTLESS THAT IT IS HARD TO SIT STILL: NEARLY EVERY DAY
1. FEELING NERVOUS, ANXIOUS, OR ON EDGE: MORE THAN HALF THE DAYS
6. BECOMING EASILY ANNOYED OR IRRITABLE: NEARLY EVERY DAY
7. FEELING AFRAID AS IF SOMETHING AWFUL MIGHT HAPPEN: SEVERAL DAYS

## 2024-07-17 ASSESSMENT — PATIENT HEALTH QUESTIONNAIRE - PHQ9
5. POOR APPETITE OR OVEREATING: NEARLY EVERY DAY
SUM OF ALL RESPONSES TO PHQ QUESTIONS 1-9: 6

## 2024-07-17 NOTE — PROGRESS NOTES
"Preventive Care Visit  [facility]  Melanie Moser MD, Family Medicine  Jul 17, 2024      Assessment & Plan     Routine general medical examination at a health care facility  - REVIEW OF HEALTH MAINTENANCE PROTOCOL ORDERS  - Pap Screen with HPV - Recommended Age 30 - 65 Years  - Hemoglobin A1c  - Comprehensive metabolic panel (BMP + Alb, Alk Phos, ALT, AST, Total. Bili, TP)  - Hemoglobin A1c  - Comprehensive metabolic panel (BMP + Alb, Alk Phos, ALT, AST, Total. Bili, TP)    Advance care planning  Would trust her , Jamal   Alternate sister Kenia Ramos to be poah as needed.    Given honoring choices to complete    Chronic pain of both knees  - Physical Therapy  Referral    Medial tibial stress syndrome, unspecified laterality, initial encounter  - Physical Therapy  Referral    Pain of both elbows  - Occupational Therapy  Referral  - LYME DISEASE TOTAL ANTIBODIES WITH REFLEX TO CONFIRMATION  - ESR: Erythrocyte sedimentation rate  - Ferritin  - Anti Nuclear Jaylin IgG by IFA with Reflex  - CBC with platelets and differential  - TSH with free T4 reflex  - Vitamin D Deficiency  - LYME DISEASE TOTAL ANTIBODIES WITH REFLEX TO CONFIRMATION  - ESR: Erythrocyte sedimentation rate  - Ferritin  - Anti Nuclear Jaylin IgG by IFA with Reflex  - CBC with platelets and differential  - TSH with free T4 reflex  - Vitamin D Deficiency    Generalized anxiety disorder  Decrease dose to 50mg and if doing okay in 1-2 months okay to try 1/2 tab- ask pt to stay in touch via mychart on dosing.    - sertraline (ZOLOFT) 50 MG tablet  Dispense: 90 tablet; Refill: 3  - Adult Mental Health  Referral      Patient has been advised of split billing requirements and indicates understanding: Yes        BMI  Estimated body mass index is 28.49 kg/m  as calculated from the following:    Height as of this encounter: 1.65 m (5' 4.96\").    Weight as of this encounter: 77.6 kg (171 lb).   Weight " management plan: Discussed healthy diet and exercise guidelines    Counseling  Appropriate preventive services were addressed with this patient via screening, questionnaire, or discussion as appropriate for fall prevention, nutrition, physical activity, Tobacco-use cessation, weight loss and cognition.  Checklist reviewing preventive services available has been given to the patient.  Reviewed patient's diet, addressing concerns and/or questions.   She is at risk for lack of exercise and has been provided with information to increase physical activity for the benefit of her well-being.           Subjective   Lizbeth is a 29 year old, presenting for the following:  Physical        7/17/2024     1:10 PM   Additional Questions   Roomed by M   Accompanied by self        Health Care Directive  Patient does not have a Health Care Directive or Living Will: Discussed advance care planning with patient; information given to patient to review.    HPI    Mental health therapist- on sertraline daily 100mg. Wondering about decreasing dose.     Elbow and knee pain-   Elbows feel strained and tight if she is carrying something.  Works at her desk and better that she has adjusted her work space.  Pain around the tip of the elbow bone.     Knees get sore below the knees on both sides.  Worse if she has been sitting for a while.  Better with stretching.  No night pain.  Achy with sitting too long.  Worse if she is cross legged.      Shin splints with running - podiatrist.  Has had shin splints forever.  Worse after running or walking.  Better with stretching.  Tight on lateral shins bilaterally.  Does have supportive arch support in her shoes.  Has arch supports OTC.      Considering having one more child- 3 is the max.  Otherwise vasectomy           7/16/2024   General Health   How would you rate your overall physical health? Good   Feel stress (tense, anxious, or unable to sleep) Very much      (!) STRESS CONCERN      7/16/2024    Nutrition   Three or more servings of calcium each day? Yes   Diet: Regular (no restrictions)   How many servings of fruit and vegetables per day? 4 or more   How many sweetened beverages each day? 0-1            7/16/2024   Exercise   Days per week of moderate/strenous exercise 2 days   Average minutes spent exercising at this level 30 min      (!) EXERCISE CONCERN      7/16/2024   Social Factors   Frequency of gathering with friends or relatives Once a week   Worry food won't last until get money to buy more No   Food not last or not have enough money for food? No   Do you have housing? (Housing is defined as stable permanent housing and does not include staying ouside in a car, in a tent, in an abandoned building, in an overnight shelter, or couch-surfing.) Yes   Are you worried about losing your housing? No   Lack of transportation? No   Unable to get utilities (heat,electricity)? No            7/16/2024   Dental   Dentist two times every year? Yes            7/16/2024   TB Screening   Were you born outside of the [place]? No            Today's PHQ-2 Score:       7/16/2024     8:37 PM   PHQ-2 ( 1999 [employer])   Q1: Little interest or pleasure in doing things 1   Q2: Feeling down, depressed or hopeless 1   PHQ-2 Score 2   Q1: Little interest or pleasure in doing things Several days   Q2: Feeling down, depressed or hopeless Several days   PHQ-2 Score 2           7/16/2024   Substance Use   Alcohol more than 3/day or more than 7/wk No   Do you use any other substances recreationally? No        Social History     Tobacco Use    Smoking status: Never    Smokeless tobacco: Never   Vaping Use    Vaping status: Never Used   Substance Use Topics    Alcohol use: No    Drug use: No     Comment: Caffeine: 1 serving          Mammogram Screening - Patient under 40 years of age: Routine Mammogram Screening not recommended.         7/16/2024   STI Screening   New sexual partner(s) since last STI/HIV test? No        History of  "abnormal Pap smear: No - age 30- 64 PAP with HPV every 5 years recommended        Latest Ref Rng & Units 3/17/2021    11:12 AM 3/9/2016    11:28 AM   PAP / HPV   PAP Negative for squamous intraepithelial lesion or malignancy. Negative for squamous intraepithelial lesion or malignancy  Electronically signed by Cynthia Salas CT (ASCP) on 3/19/2021 at 10:46 AM    Negative for squamous intraepithelial lesion or malignancy  Electronically signed by Jennifer Sarmiento CT (ASCP) on 3/17/2016 at  9:31 AM              7/16/2024   Contraception/Family Planning   Questions about contraception or family planning No           Reviewed and updated as needed this visit by Provider   Tobacco  Allergies  Meds  Problems  Med Hx  Surg Hx  Fam Hx  Soc   Hx Sexual Activity               Objective    Exam  BP (!) 80/64 (BP Location: Right arm, Patient Position: Sitting, Cuff Size: Adult Large)   Pulse 79   Resp 19   Ht 1.65 m (5' 4.96\")   Wt 77.6 kg (171 lb)   LMP 06/10/2024   SpO2 96%   BMI 28.49 kg/m     Estimated body mass index is 28.49 kg/m  as calculated from the following:    Height as of this encounter: 1.65 m (5' 4.96\").    Weight as of this encounter: 77.6 kg (171 lb).    Physical Exam  Complete 10 point ROS completed today as part of the exam and patient denies any symptoms as reviewed in HPI     Wt Readings from Last 3 Encounters:   07/17/24 77.6 kg (171 lb)   05/24/23 71.7 kg (158 lb)   04/26/23 74.8 kg (165 lb)       Patient's last menstrual period was 06/10/2024.    All normal as below except abnormalities include: all normal   General is a  29 year old sitting comfortably in no apparent distress   HEENT:  TM are clear bilaterally.  Eye exams within normal   Neck: Supple without lymphadenopathy or thyromegally  CV: Regular rate and rhythm S1S2 without rubs, murmurs or gallops,   Lungs: Clear to auscultation bilaterally  Abd:  +BS, soft NT/ND,  No masses or organomegally,   Extremities: Warm, No " Edema, 2+ Pedal and radial pulses bilaterally  Skin: No lesions or rashes noted  Neuro: Able to ambulate around the exam room with equal movement, strength and normal coordination of the upper and lower extremeties symmetrically  Pelvic: Normal external genitalia.  Healthy normal vaginal mucosa.  Health appearing cervix.  Testing obtained without pain or difficulty.         Follow-up Visit   Expected date:  Jul 17, 2025 (Approximate)      Follow Up Appointment Details:     Follow-up with whom?: PCP    Follow-Up for what?: Adult Preventive    How?: In Person                     Melanie Moser MD         Signed Electronically by: Melanie Moser MD      Prior to immunization administration, verified patients identity using patient s name and date of birth. Please see Immunization Activity for additional information.     Screening Questionnaire for Adult Immunization    Are you sick today?   No   Do you have allergies to medications, food, a vaccine component or latex?   No   Have you ever had a serious reaction after receiving a vaccination?   No   Do you have a long-term health problem with heart, lung, kidney, or metabolic disease (e.g., diabetes), asthma, a blood disorder, no spleen, complement component deficiency, a cochlear implant, or a spinal fluid leak?  Are you on long-term aspirin therapy?   No   Do you have cancer, leukemia, HIV/AIDS, or any other immune system problem?   No   Do you have a parent, brother, or sister with an immune system problem?   No   In the past 3 months, have you taken medications that affect  your immune system, such as prednisone, other steroids, or anticancer drugs; drugs for the treatment of rheumatoid arthritis, Crohn s disease, or psoriasis; or have you had radiation treatments?   No   Have you had a seizure, or a brain or other nervous system problem?   No   During the past year, have you received a transfusion of blood or blood    products, or been given immune (gamma) globulin  or antiviral drug?   No   For women: Are you pregnant or is there a chance you could become       pregnant during the next month?   No   Have you received any vaccinations in the past 4 weeks?   No     Immunization questionnaire answers were all negative.      Patient instructed to remain in clinic for 15 minutes afterwards, and to report any adverse reactions.     Screening performed by MICHELINE Nolen MA on 7/17/2024 at 1:13 PM.

## 2024-07-17 NOTE — PATIENT INSTRUCTIONS
Patient Education     Blood type is B+     Restart prenatal vitamin daily or women's one a day vitamin   Preventive Care Advice   This is general advice given by our system to help you stay healthy. However, your care team may have specific advice just for you. Please talk to your care team about your preventive care needs.  Nutrition  Eat 5 or more servings of fruits and vegetables each day.  Try wheat bread, brown rice and whole grain pasta (instead of white bread, rice, and pasta).  Get enough calcium and vitamin D. Check the label on foods and aim for 100% of the RDA (recommended daily allowance).  Lifestyle  Exercise at least 150 minutes each week  (30 minutes a day, 5 days a week).  Do muscle strengthening activities 2 days a week. These help control your weight and prevent disease.  No smoking.  Wear sunscreen to prevent skin cancer.  Have a dental exam and cleaning every 6 months.  Yearly exams  See your health care team every year to talk about:  Any changes in your health.  Any medicines your care team has prescribed.  Preventive care, family planning, and ways to prevent chronic diseases.  Shots (vaccines)   HPV shots (up to age 26), if you've never had them before.  Hepatitis B shots (up to age 59), if you've never had them before.  COVID-19 shot: Get this shot when it's due.  Flu shot: Get a flu shot every year.  Tetanus shot: Get a tetanus shot every 10 years.  Pneumococcal, hepatitis A, and RSV shots: Ask your care team if you need these based on your risk.  Shingles shot (for age 50 and up)  General health tests  Diabetes screening:  Starting at age 35, Get screened for diabetes at least every 3 years.  If you are younger than age 35, ask your care team if you should be screened for diabetes.  Cholesterol test: At age 39, start having a cholesterol test every 5 years, or more often if advised.  Bone density scan (DEXA): At age 50, ask your care team if you should have this scan for osteoporosis  (brittle bones).  Hepatitis C: Get tested at least once in your life.  STIs (sexually transmitted infections)  Before age 24: Ask your care team if you should be screened for STIs.  After age 24: Get screened for STIs if you're at risk. You are at risk for STIs (including HIV) if:  You are sexually active with more than one person.  You don't use condoms every time.  You or a partner was diagnosed with a sexually transmitted infection.  If you are at risk for HIV, ask about PrEP medicine to prevent HIV.  Get tested for HIV at least once in your life, whether you are at risk for HIV or not.  Cancer screening tests  Cervical cancer screening: If you have a cervix, begin getting regular cervical cancer screening tests starting at age 21.  Breast cancer scan (mammogram): If you've ever had breasts, begin having regular mammograms starting at age 40. This is a scan to check for breast cancer.  Colon cancer screening: It is important to start screening for colon cancer at age 45.  Have a colonoscopy test every 10 years (or more often if you're at risk) Or, ask your provider about stool tests like a FIT test every year or Cologuard test every 3 years.  To learn more about your testing options, visit:   .  For help making a decision, visit:   https://bit.ly/xn60992.  Prostate cancer screening test: If you have a prostate, ask your care team if a prostate cancer screening test (PSA) at age 55 is right for you.  Lung cancer screening: If you are a current or former smoker ages 50 to 80, ask your care team if ongoing lung cancer screenings are right for you.  For informational purposes only. Not to replace the advice of your health care provider. Copyright   2023 [employer]. All rights reserved. Clinically reviewed by the [facility] Transitions Program. [employer] [id number] - REV 01/24.  Learning About Stress  What is stress?     Stress is your body's response to a hard situation. Your body can have a  physical, emotional, or mental response. Stress is a fact of life for most people, and it affects everyone differently. What causes stress for you may not be stressful for someone else.  A lot of things can cause stress. You may feel stress when you go on a job interview, take a test, or run a race. This kind of short-term stress is normal and even useful. It can help you if you need to work hard or react quickly. For example, stress can help you finish an important job on time.  Long-term stress is caused by ongoing stressful situations or events. Examples of long-term stress include long-term health problems, ongoing problems at work, or conflicts in your family. Long-term stress can harm your health.  How does stress affect your health?  When you are stressed, your body responds as though you are in danger. It makes hormones that speed up your heart, make you breathe faster, and give you a burst of energy. This is called the fight-or-flight stress response. If the stress is over quickly, your body goes back to normal and no harm is done.  But if stress happens too often or lasts too long, it can have bad effects. Long-term stress can make you more likely to get sick, and it can make symptoms of some diseases worse. If you tense up when you are stressed, you may develop neck, shoulder, or low back pain. Stress is linked to high blood pressure and heart disease.  Stress also harms your emotional health. It can make you posey, tense, or depressed. Your relationships may suffer, and you may not do well at work or school.  What can you do to manage stress?  You can try these things to help manage stress:   Do something active. Exercise or activity can help reduce stress. Walking is a great way to get started. Even everyday activities such as housecleaning or yard work can help.  Try yoga or osman chi. These techniques combine exercise and meditation. You may need some training at first to learn them.  Do something you  "enjoy. For example, listen to music or go to a movie. Practice your hobby or do volunteer work.  Meditate. This can help you relax, because you are not worrying about what happened before or what may happen in the future.  Do guided imagery. Imagine yourself in any setting that helps you feel calm. You can use online videos, books, or a teacher to guide you.  Do breathing exercises. For example:  From a standing position, bend forward from the waist with your knees slightly bent. Let your arms dangle close to the floor.  Breathe in slowly and deeply as you return to a standing position. Roll up slowly and lift your head last.  Hold your breath for just a few seconds in the standing position.  Breathe out slowly and bend forward from the waist.  Let your feelings out. Talk, laugh, cry, and express anger when you need to. Talking with supportive friends or family, a counselor, or a shannon leader about your feelings is a healthy way to relieve stress. Avoid discussing your feelings with people who make you feel worse.  Write. It may help to write about things that are bothering you. This helps you find out how much stress you feel and what is causing it. When you know this, you can find better ways to cope.  What can you do to prevent stress?  You might try some of these things to help prevent stress:  Manage your time. This helps you find time to do the things you want and need to do.  Get enough sleep. Your body recovers from the stresses of the day while you are sleeping.  Get support. Your family, friends, and community can make a difference in how you experience stress.  Limit your news feed. Avoid or limit time on social media or news that may make you feel stressed.  Do something active. Exercise or activity can help reduce stress. Walking is a great way to get started.  Where can you learn more?  Go to https://www.healthwise.net/patiented  Enter N032 in the search box to learn more about \"Learning About " "Stress.\"  Current as of: October 24, 2023               Content Version: 14.0    1141-4145 [employer].   Care instructions adapted under license by your healthcare professional. If you have questions about a medical condition or this instruction, always ask your healthcare professional. [employer] disclaims any warranty or liability for your use of this information.         "

## 2024-07-18 LAB
ALBUMIN SERPL BCG-MCNC: 4.5 G/DL (ref 3.5–5.2)
ALP SERPL-CCNC: 62 U/L (ref 40–150)
ALT SERPL W P-5'-P-CCNC: 14 U/L (ref 0–50)
ANA PAT SER IF-IMP: ABNORMAL
ANA SER QL IF: ABNORMAL
ANA TITR SER IF: ABNORMAL {TITER}
ANION GAP SERPL CALCULATED.3IONS-SCNC: 9 MMOL/L (ref 7–15)
AST SERPL W P-5'-P-CCNC: 18 U/L (ref 0–45)
B BURGDOR IGG+IGM SER QL: 0.63
BILIRUB SERPL-MCNC: 0.3 MG/DL
BUN SERPL-MCNC: 13.9 MG/DL (ref 6–20)
CALCIUM SERPL-MCNC: 9.4 MG/DL (ref 8.8–10.4)
CHLORIDE SERPL-SCNC: 102 MMOL/L (ref 98–107)
CREAT SERPL-MCNC: 0.6 MG/DL (ref 0.51–0.95)
EGFRCR SERPLBLD CKD-EPI 2021: >90 ML/MIN/1.73M2
FERRITIN SERPL-MCNC: 108 NG/ML (ref 6–175)
GLUCOSE SERPL-MCNC: 85 MG/DL (ref 70–99)
HCO3 SERPL-SCNC: 29 MMOL/L (ref 22–29)
HPV HR 12 DNA CVX QL NAA+PROBE: NEGATIVE
HPV16 DNA CVX QL NAA+PROBE: NEGATIVE
HPV18 DNA CVX QL NAA+PROBE: NEGATIVE
HUMAN PAPILLOMA VIRUS FINAL DIAGNOSIS: NORMAL
POTASSIUM SERPL-SCNC: 3.9 MMOL/L (ref 3.4–5.3)
PROT SERPL-MCNC: 7.6 G/DL (ref 6.4–8.3)
SODIUM SERPL-SCNC: 140 MMOL/L (ref 135–145)
TSH SERPL DL<=0.005 MIU/L-ACNC: 2.57 UIU/ML (ref 0.3–4.2)
VIT D+METAB SERPL-MCNC: 26 NG/ML (ref 20–50)

## 2024-07-23 LAB
BKR LAB AP GYN ADEQUACY: NORMAL
BKR LAB AP GYN INTERPRETATION: NORMAL
BKR LAB AP PREVIOUS ABNORMAL: NORMAL
PATH REPORT.COMMENTS IMP SPEC: NORMAL
PATH REPORT.COMMENTS IMP SPEC: NORMAL
PATH REPORT.RELEVANT HX SPEC: NORMAL

## 2024-08-12 ENCOUNTER — THERAPY VISIT (OUTPATIENT)
Dept: PHYSICAL THERAPY | Facility: REHABILITATION | Age: 29
End: 2024-08-12
Attending: FAMILY MEDICINE
Payer: COMMERCIAL

## 2024-08-12 DIAGNOSIS — G89.29 CHRONIC PAIN OF BOTH KNEES: ICD-10-CM

## 2024-08-12 DIAGNOSIS — M25.562 CHRONIC PAIN OF BOTH KNEES: ICD-10-CM

## 2024-08-12 DIAGNOSIS — M25.561 CHRONIC PAIN OF BOTH KNEES: ICD-10-CM

## 2024-08-12 DIAGNOSIS — S86.899A MEDIAL TIBIAL STRESS SYNDROME, UNSPECIFIED LATERALITY, INITIAL ENCOUNTER: ICD-10-CM

## 2024-08-12 PROCEDURE — 97161 PT EVAL LOW COMPLEX 20 MIN: CPT | Mod: GP | Performed by: PHYSICAL THERAPIST

## 2024-08-12 PROCEDURE — 97112 NEUROMUSCULAR REEDUCATION: CPT | Mod: GP | Performed by: PHYSICAL THERAPIST

## 2024-08-12 PROCEDURE — 97110 THERAPEUTIC EXERCISES: CPT | Mod: GP | Performed by: PHYSICAL THERAPIST

## 2024-08-12 ASSESSMENT — ACTIVITIES OF DAILY LIVING (ADL)
PAIN: THE SYMPTOM AFFECTS MY ACTIVITY SLIGHTLY
STANDING_FOR_1_HOUR: A LITTLE BIT OF DIFFICULTY
WEAKNESS: THE SYMPTOM AFFECTS MY ACTIVITY SLIGHTLY
LIFTING_AN_OBJECT,_LIKE_A_BAG_OF_GROCERIES_FROM_THE_FLOOR: MODERATE DIFFICULTY
GIVING WAY, BUCKLING OR SHIFTING OF KNEE: I DO NOT HAVE THE SYMPTOM
HOW_WOULD_YOU_RATE_THE_OVERALL_FUNCTION_OF_YOUR_KNEE_DURING_YOUR_USUAL_DAILY_ACTIVITIES?: ABNORMAL
RAW_SCORE: 45
STAND: ACTIVITY IS SOMEWHAT DIFFICULT
WEAKNESS: THE SYMPTOM AFFECTS MY ACTIVITY SLIGHTLY
GO UP STAIRS: ACTIVITY IS NOT DIFFICULT
HOW_WOULD_YOU_RATE_THE_CURRENT_FUNCTION_OF_YOUR_KNEE_DURING_YOUR_USUAL_DAILY_ACTIVITIES_ON_A_SCALE_FROM_0_TO_100_WITH_100_BEING_YOUR_LEVEL_OF_KNEE_FUNCTION_PRIOR_TO_YOUR_INJURY_AND_0_BEING_THE_INABILITY_TO_PERFORM_ANY_OF_YOUR_USUAL_DAILY_ACTIVITIES?: 60
WALKING_A_MILE: MODERATE DIFFICULTY
ANY_OF_YOUR_USUAL_WORK,_HOUSEWORK_OR_SCHOOL_ACTIVITIES: QUITE A BIT OF DIFFICULTY
WALK: ACTIVITY IS MINIMALLY DIFFICULT
SQUAT: ACTIVITY IS FAIRLY DIFFICULT
SWELLING: I DO NOT HAVE THE SYMPTOM
PERFORMING_HEAVY_ACTIVITIES_AROUND_YOUR_HOME: EXTREME DIFFICULTY OR UNABLE TO PERFORM ACTIVITY
KNEEL ON THE FRONT OF YOUR KNEE: ACTIVITY IS VERY DIFFICULT
GETTING_INTO_AND_OUT_OF_A_BATH: A LITTLE BIT OF DIFFICULTY
SIT WITH YOUR KNEE BENT: ACTIVITY IS VERY DIFFICULT
GO UP STAIRS: ACTIVITY IS NOT DIFFICULT
SWELLING: I DO NOT HAVE THE SYMPTOM
YOUR_USUAL_HOBBIES,_RECREATIONAL_OR_SPORTING_ACTIVITIES: EXTREME DIFFICULTY OR UNABLE TO PERFORM ACTIVITY
LEFS_RAW_SCORE: 0
KNEE_ACTIVITY_OF_DAILY_LIVING_SCORE: 64.29
PLEASE_INDICATE_YOR_PRIMARY_REASON_FOR_REFERRAL_TO_THERAPY:: FOOT AND/OR ANKLE
HOW_WOULD_YOU_RATE_THE_OVERALL_FUNCTION_OF_YOUR_KNEE_DURING_YOUR_USUAL_DAILY_ACTIVITIES?: ABNORMAL
WALK: ACTIVITY IS MINIMALLY DIFFICULT
GIVING WAY, BUCKLING OR SHIFTING OF KNEE: I DO NOT HAVE THE SYMPTOM
GO DOWN STAIRS: ACTIVITY IS SOMEWHAT DIFFICULT
SHOPPING: A LITTLE BIT OF DIFFICULTY
WALKING_BETWEEN_ROOMS: NO DIFFICULTY
AS_A_RESULT_OF_YOUR_KNEE_INJURY,_HOW_WOULD_YOU_RATE_YOUR_CURRENT_LEVEL_OF_DAILY_ACTIVITY?: ABNORMAL
STIFFNESS: THE SYMPTOM AFFECTS MY ACTIVITY SEVERELY
SQUATTING: EXTREME DIFFICULTY OR UNABLE TO PERFORM ACTIVITY
RISE FROM A CHAIR: ACTIVITY IS MINIMALLY DIFFICULT
LEFS_SCORE(%): 0
STIFFNESS: THE SYMPTOM AFFECTS MY ACTIVITY SEVERELY
KNEE_ACTIVITY_OF_DAILY_LIVING_SUM: 45
LIMPING: I DO NOT HAVE THE SYMPTOM
PAIN: THE SYMPTOM AFFECTS MY ACTIVITY SLIGHTLY
GETTING_INTO_OR_OUT_OF_A_CAR: A LITTLE BIT OF DIFFICULTY
GO DOWN STAIRS: ACTIVITY IS SOMEWHAT DIFFICULT
LIMPING: I DO NOT HAVE THE SYMPTOM
RUNNING_ON_UNEVEN_GROUND: QUITE A BIT OF DIFFICULTY
STAND: ACTIVITY IS SOMEWHAT DIFFICULT
AS_A_RESULT_OF_YOUR_KNEE_INJURY,_HOW_WOULD_YOU_RATE_YOUR_CURRENT_LEVEL_OF_DAILY_ACTIVITY?: ABNORMAL
PLEASE_INDICATE_YOR_PRIMARY_REASON_FOR_REFERRAL_TO_THERAPY:: KNEE
KNEEL ON THE FRONT OF YOUR KNEE: ACTIVITY IS VERY DIFFICULT
WALKING_2_BLOCKS: MODERATE DIFFICULTY
PERFORMING_LIGHT_ACTIVITIES_AROUND_YOUR_HOME: MODERATE DIFFICULTY
ROLLING_OVER_IN_BED: A LITTLE BIT OF DIFFICULTY
RISE FROM A CHAIR: ACTIVITY IS MINIMALLY DIFFICULT
SIT WITH YOUR KNEE BENT: ACTIVITY IS VERY DIFFICULT
MAKING_SHARP_TURNS_WHILE_RUNNING_FAST: QUITE A BIT OF DIFFICULTY
SITTING_FOR_1_HOUR: MODERATE DIFFICULTY
SQUAT: ACTIVITY IS FAIRLY DIFFICULT
RUNNING_ON_EVEN_GROUND: MODERATE DIFFICULTY
PUTTING_ON_YOUR_SHOES_OR_SOCKS: NO DIFFICULTY
GOING_UP_OR_DOWN_10_STAIRS: A LITTLE BIT OF DIFFICULTY
HOW_WOULD_YOU_RATE_THE_CURRENT_FUNCTION_OF_YOUR_KNEE_DURING_YOUR_USUAL_DAILY_ACTIVITIES_ON_A_SCALE_FROM_0_TO_100_WITH_100_BEING_YOUR_LEVEL_OF_KNEE_FUNCTION_PRIOR_TO_YOUR_INJURY_AND_0_BEING_THE_INABILITY_TO_PERFORM_ANY_OF_YOUR_USUAL_DAILY_ACTIVITIES?: 60

## 2024-08-12 NOTE — PROGRESS NOTES
"PHYSICAL THERAPY EVALUATION  Type of Visit: Evaluation       Fall Risk Screen:  Fall screen completed by: PT  Have you fallen 2 or more times in the past year?: No  Have you fallen and had an injury in the past year?: No  Is patient a fall risk?: No    Subjective       Presenting condition or subjective complaint: Knees feeling strained and difficulty running without getting shin splints    Lizbeth presents to physical therapy today with reports of shin splints \"forever\" but has been taking more preventative care over the past year. Started wearing more supportive footwear, better arch support which has been helping somewhat. She is also endorsing bilateral knee pain within the last 6 months with prolonged sitting-- starts to notice it when sitting at the computer for >1 hour. When running she endorses antione knee stiffness and antione shin pain, but has not ran in about 1 month. Shin pain while running initially, but then starts to go away-- notices the most pain the next day. Walking increases shin pain, will walk up hills which does tend to make pain worse as well. Sometimes standing for longer periods will bother her, but not much. Pt also endorses antione medial arch tightness and right medial arch pain upon walking, but this resolves as she continues walking.     Pain location: Anterior shin, lateral aspect of tibia, inferior patellar region bilaterally    Date of onset:      Relevant medical history: Mental Illness; Severe headaches   Dates & types of surgery:      Prior diagnostic imaging/testing results:       Prior therapy history for the same diagnosis, illness or injury: No      Prior Level of Function  Transfers:   Ambulation:   ADL:   IADL:     Living Environment  Social support: With a significant other or spouse   Type of home: House; 2-story; Basement   Stairs to enter the home: Yes 7 Is there a railing: Yes     Ramp: No   Stairs inside the home: Yes 15 Is there a railing: Yes     Help at home: None  Equipment " owned:       Employment: Yes   Hobbies/Interests: Walking, running, swimming, gardening    Patient goals for therapy: Run, sit cross legged, get up easily from squat    Pain assessment:      Objective   KNEE EVALUATION  POSTURE:  Forward head, rounded shoulders  GAIT:  Weightbearing Status:   Assistive Device(s):   Gait Deviations:  Grossly WNL  BALANCE/PROPRIOCEPTION:  SLS: stable, no LOB, no hip drop  WEIGHTBEARING ALIGNMENT:  Renan pes planus, renan genu recurvatum, minimal knee valgus bilaterally  NON-WEIGHTBEARING ALIGNMENT:   ROM:  WNL, pain at end range knee flexion bilaterally    STRENGTH:  Hip abduction: 5/5 left, 4+/5 right   Hip extension: 4+/5 renan. Otherwise knee and hip strength 5/5  FLEXIBILITY:  WNL apart from min decreased renan soleus flexibility  SPECIAL TESTS:  Patellar apprehension: (-) renan  FUNCTIONAL TESTS:  BW double leg squat: Renan pes planus, renan knee pain, mild anterior translation of tibias bilaterally   Single leg hop test: R x10; L x10 with L>R lateral tibial pain   PALPATION:  TTP renan medial arches, renan navicular,  renan posterior tibialis, renan soleus, renan patellar tendons  JOINT MOBILITY:  Patellar hypermobility noted in all directions, bilaterally    Assessment & Plan   CLINICAL IMPRESSIONS  Medical Diagnosis: Chronic pain of both knees, Medial tibial stress syndrome, unspecified laterality, initial encounter    Treatment Diagnosis:     Impression/Assessment: Patient is a 29 year old female with 6 month history of bilateral inferior knee pain with sitting, walking, running & longer history of bilateral lateral tibial pain with walking and running, worsening recently. The following significant findings have been identified: Pain, Decreased strength, Decreased activity tolerance, and Impaired posture. These impairments interfere with their ability to perform recreational activities, household chores, and community mobility as compared to previous level of function.     Clinical  Decision Making (Complexity):  Clinical Presentation: Stable/Uncomplicated  Clinical Presentation Rationale: based on medical and personal factors listed in PT evaluation  Clinical Decision Making (Complexity): Low complexity    PLAN OF CARE  Treatment Interventions:  Modalities: Cryotherapy, Dry Needling, E-stim, Hot Pack  Interventions: Gait Training, Manual Therapy, Neuromuscular Re-education, Therapeutic Activity, Therapeutic Exercise, Self-Care/Home Management    Long Term Goals     PT Goal 1  Goal Identifier: HEP  Goal Description: In 30 days, pt will demonstrate understanding of and independence with her HEP  Goal Progress: Initial  Target Date: 09/11/24  PT Goal 2  Goal Identifier: Sitting  Goal Description: In 60 days, pt will report 0/10 antione knee pain when sitting for any duration (up to 2 hours) in order to perform work duties with ease and spend time with her family comfortably.  Goal Progress: Initial  Target Date: 10/11/24  PT Goal 3  Goal Identifier: Walking  Goal Description: In 60 days, pt will be able to walk at least 3 miles on even and uneven surfaces & up/down hills with no knee pain and <2/10 antione lower anterior LE pain to maintain an active and healthy lifestyle.  Goal Progress: Initial  Target Date: 10/11/24  PT Goal 4  Goal Identifier: Squatting  Goal Description: In 60 days, pt will be able to perform at least 10 BW squats with no knee pain bilaterally.  Rationale: to maximize safety and independence with performance of ADLs and functional tasks  Goal Progress: Initial  Target Date: 10/11/24      Frequency of Treatment: 1x/week  Duration of Treatment: 60 days    Recommended Referrals to Other Professionals:   Education Assessment:   Learner/Method: Patient    Risks and benefits of evaluation/treatment have been explained.   Patient/Family/caregiver agrees with Plan of Care.     Evaluation Time:     PT Eval, Low Complexity Minutes (68526): 20       Signing Clinician: Melida Murray PT

## 2024-08-19 ENCOUNTER — THERAPY VISIT (OUTPATIENT)
Dept: PHYSICAL THERAPY | Facility: REHABILITATION | Age: 29
End: 2024-08-19
Attending: FAMILY MEDICINE
Payer: COMMERCIAL

## 2024-08-19 DIAGNOSIS — M25.562 CHRONIC PAIN OF BOTH KNEES: Primary | ICD-10-CM

## 2024-08-19 DIAGNOSIS — M25.561 CHRONIC PAIN OF BOTH KNEES: Primary | ICD-10-CM

## 2024-08-19 DIAGNOSIS — G89.29 CHRONIC PAIN OF BOTH KNEES: Primary | ICD-10-CM

## 2024-08-19 DIAGNOSIS — S86.899A MEDIAL TIBIAL STRESS SYNDROME, UNSPECIFIED LATERALITY, INITIAL ENCOUNTER: ICD-10-CM

## 2024-08-19 PROCEDURE — 97112 NEUROMUSCULAR REEDUCATION: CPT | Mod: GP | Performed by: PHYSICAL THERAPIST

## 2024-08-19 PROCEDURE — 97110 THERAPEUTIC EXERCISES: CPT | Mod: GP | Performed by: PHYSICAL THERAPIST

## 2024-08-26 ENCOUNTER — THERAPY VISIT (OUTPATIENT)
Dept: PHYSICAL THERAPY | Facility: REHABILITATION | Age: 29
End: 2024-08-26
Attending: FAMILY MEDICINE
Payer: COMMERCIAL

## 2024-08-26 DIAGNOSIS — M25.562 CHRONIC PAIN OF BOTH KNEES: Primary | ICD-10-CM

## 2024-08-26 DIAGNOSIS — S86.899A MEDIAL TIBIAL STRESS SYNDROME, UNSPECIFIED LATERALITY, INITIAL ENCOUNTER: ICD-10-CM

## 2024-08-26 DIAGNOSIS — G89.29 CHRONIC PAIN OF BOTH KNEES: Primary | ICD-10-CM

## 2024-08-26 DIAGNOSIS — M25.561 CHRONIC PAIN OF BOTH KNEES: Primary | ICD-10-CM

## 2024-08-26 PROCEDURE — 97112 NEUROMUSCULAR REEDUCATION: CPT | Mod: GP | Performed by: PHYSICAL THERAPIST

## 2024-08-26 PROCEDURE — 97110 THERAPEUTIC EXERCISES: CPT | Mod: GP | Performed by: PHYSICAL THERAPIST

## 2024-09-03 NOTE — PROGRESS NOTES
OCCUPATIONAL THERAPY EVALUATION  Type of Visit: Evaluation        Fall Risk Screen:  Fall screen completed by: PT  Have you fallen 2 or more times in the past year?: No  Have you fallen and had an injury in the past year?: No  Is patient a fall risk?: No    Subjective      Presenting condition or subjective complaint: Knees feeling strained and difficulty running without getting shin splints  Date of onset: 07/17/24 (Referral)    Relevant medical history: Mental Illness; Severe headaches   Dates & types of surgery:      Patient is a pleasant young lady that comes to therapy today for evaluation and treatment of bilateral elbow pain. This began in March to April of this year with a job change, now works from home more extensively and feels that her workstation may be subpar. Elbows feel strained and tight if she is carrying something, bending the elbows past 90 degrees also tends to be painful. Denies paraesthesias at this time. Thus far she has tried rest, massage and stretching with minimal relief. She has made some workstation changes already, purchasing a wrist rest for her mouse and keyboard, a standing desk, improved office chair, and separate monitor.     Prior diagnostic imaging/testing results:       Prior therapy history for the same diagnosis, illness or injury: No      Prior Level of Function  Transfers: Independent  Ambulation: Independent  ADL: Independent  IADL: Driving, Finances, Housekeeping, Laundry, Meal preparation, Medication management, Work, Yard work    Living Environment  Social support: With a significant other or spouse   Type of home: House; 2-story; Basement   Stairs to enter the home: Yes 7 Is there a railing: Yes     Ramp: No   Stairs inside the home: Yes 15 Is there a railing: Yes     Help at home: None  Equipment owned:       Employment: Yes   Hobbies/Interests: Walking, running, swimming, gardening    Patient goals for therapy: Run, sit cross legged, get up easily from  squat       Objective   ADDITIONAL HISTORY:  Right hand dominant  Patient reports symptoms of pain, stiffness/loss of motion, and weakness/loss of strength  Transportation: drives  Currently working in normal job without restrictions    Functional Outcome Measure:   Upper Extremity Functional Index Score:  SCORE:   Column Totals: /80: 57   (A lower score indicates greater disability.)    PAIN:  Pain Level at Rest: 2/10  Pain Level with Use: 7/10  Pain Location: Bilateral posterolateral elbow, R = L   Pain Quality: Aching  Pain Frequency: intermittent  Pain is Worst: daytime  Pain is Exacerbated By: Keyboarding, lifting, sleeping   Pain is Relieved By: rest, stretch, and massage/repositioning  Pain Progression: Improved, with workstation changes.     POSTURE: Rounded Forward Shoulders     EDEMA: None     SENSATION: WNL throughout all nerve distributions; per patient report     SCREENS:  Appreciable tightness to the pectoralis and upper trapezius bilaterally, though overall cervical AROM is WNL and without pain.       ROM:  AROM of the bilateral elbow, FA, wrist, thumb and digits are grossly WNL and equivalent bilaterally.     OBSERVATIONS/APPEARANCE:  Mild extrinsic wrist extensor and flexor tightness bilaterally.       SPECIAL TESTS:   CTS Special Tests  Pain Report Left Right   Median Nerve Compression at Pronator NT NT   Carpal Compression Test-Durkan Test (30 sec) Trace positive at 25-30 seconds.  Trace positive at 25-30 seconds.    Lincoln Test for Lumbrical Incursion (fist x30 sec) Negative Negative   Tinel's at Carpal Tunnel Negative Negative   Phalen's Sign Trace positive at 25-30 seconds.  Trace positive at 25-30 seconds.      Ulnar Nerve Special Tests  Pain Report Left Right   Tinel's Cubital Tunnel Negative  Positive   Elbow Flexion Test Trace positive at 25-30 seconds.  Trace positive at 25-30 seconds.    Ulnar N Subluxation at Elbow NT NT     RESISTED TESTING:  Non-tender bilaterally with resisted  wrist/elbow extension and flexion      STRENGTH:  NT today.     PALPATION:  Mild TTP to the bilateral MEP, LEP and left distal tricep insertion, negative right.    Assessment & Plan   CLINICAL IMPRESSIONS  Medical Diagnosis: Bilateral elbow pain    Treatment Diagnosis: Bilateral elbow pain    Impression/Assessment: Pt is a 29 year old female presenting to Occupational Therapy due to bilateral elbow pain.  The following significant findings have been identified: Impaired activity tolerance, Impaired ROM, Impaired sensation, Impaired strength, and Pain.  These identified deficits interfere with their ability to perform self care tasks, work tasks, recreational activities, household chores, driving , medication management, financial management,  yard work, care of others, and meal planning and preparation as compared to previous level of function.   Patient's limitations or Problem List includes: Pain, Decreased ROM/motion, Weakness, Sensory disturbance, Hypermobility, Hypomobility, Decreased , Decreased pinch, Decreased coordination, Decreased dexterity, and Tightness in musculature of the bilateral cervical spine, thoracic spine, shoulder, elbow, wrist, hand, thumb, index finger, long finger, ring finger, and small finger which interferes with the patient's ability to perform Self Care Tasks (dressing, eating, bathing, hygiene/toileting), Work Tasks, Sleep Patterns, Recreational Activities, Household Chores, and Driving  as compared to previous level of function.    Clinical Decision Making (Complexity):  Assessment of Occupational Performance: 3-5 Performance Deficits  Occupational Performance Limitations: bathing/showering, toileting, dressing, feeding, personal device care, hygiene and grooming, child rearing, communication management, driving and community mobility, health management and maintenance, home establishment and management, meal preparation and cleanup, shopping, sleep, school, work, leisure  activities, and social participation  Clinical Decision Making (Complexity): Low complexity    PLAN OF CARE  Treatment Interventions:  Modalities:  US  Therapeutic Exercise:  AROM, AAROM, PROM, Tendon Gliding, Blocking, Reverse Blocking, Place and Hold, Contract Relax, Extensor Tracking, Isotonics, Isometrics, and Stabilization  Neuromuscular re-education:  Nerve Gliding, Coordination/Dexterity, Kinesthetic Training, Proprioceptive Training, Posture, Kinesiotaping, Strain Counter Strain, Isometrics, and Stabilization  Manual Techniques:  Coordination/Dexterity, Joint mobilization, Friction massage, Myofascial release, and Manual edema mobilization  Orthotic Fabrication:  Static, Finger based, Hand based, Forearm based, and Long arm  Self Care:  Self Care Tasks, Ergonomic Considerations, and Work Tasks    Long Term Goals   OT Goal 1  Goal Identifier: Goal I  Goal Description: Patient will complete required ADL, IADL, work and leisure tasks with little to no pain and/or difficulty utilizing AE, soft tissue/nerve protection principles and orthotics as needed.  (0-2/10)  Rationale: In order to maximize safety and independence with performance of self-care activities;In order to maximize safety and independence with ADL/IADLs  Goal Progress: New  Target Date: 10/30/24      Frequency of Treatment: 1 time per week  Duration of Treatment: 8 weeks     Education Assessment: Learner/Method: Patient;No Barriers to Learning;Pictures/Video;Demonstration;Reading;Listening     Risks and benefits of evaluation/treatment have been explained.   Patient/Family/caregiver agrees with Plan of Care.     Evaluation Time:    OT Eval, Low Complexity Minutes (00571): 19    Signing Clinician: Jose Cronin OT

## 2024-09-04 ENCOUNTER — THERAPY VISIT (OUTPATIENT)
Dept: OCCUPATIONAL THERAPY | Facility: REHABILITATION | Age: 29
End: 2024-09-04
Attending: FAMILY MEDICINE
Payer: COMMERCIAL

## 2024-09-04 DIAGNOSIS — M25.521 PAIN OF BOTH ELBOWS: ICD-10-CM

## 2024-09-04 DIAGNOSIS — M25.522 PAIN OF BOTH ELBOWS: ICD-10-CM

## 2024-09-04 PROCEDURE — 97530 THERAPEUTIC ACTIVITIES: CPT | Mod: GO | Performed by: OCCUPATIONAL THERAPIST

## 2024-09-04 PROCEDURE — 97110 THERAPEUTIC EXERCISES: CPT | Mod: GO | Performed by: OCCUPATIONAL THERAPIST

## 2024-09-04 PROCEDURE — 97165 OT EVAL LOW COMPLEX 30 MIN: CPT | Mod: GO | Performed by: OCCUPATIONAL THERAPIST

## 2024-09-05 ENCOUNTER — THERAPY VISIT (OUTPATIENT)
Dept: PHYSICAL THERAPY | Facility: REHABILITATION | Age: 29
End: 2024-09-05
Payer: COMMERCIAL

## 2024-09-05 DIAGNOSIS — M25.562 CHRONIC PAIN OF BOTH KNEES: Primary | ICD-10-CM

## 2024-09-05 DIAGNOSIS — M25.561 CHRONIC PAIN OF BOTH KNEES: Primary | ICD-10-CM

## 2024-09-05 DIAGNOSIS — S86.899A MEDIAL TIBIAL STRESS SYNDROME, UNSPECIFIED LATERALITY, INITIAL ENCOUNTER: ICD-10-CM

## 2024-09-05 DIAGNOSIS — G89.29 CHRONIC PAIN OF BOTH KNEES: Primary | ICD-10-CM

## 2024-09-05 PROCEDURE — 97110 THERAPEUTIC EXERCISES: CPT | Mod: GP | Performed by: PHYSICAL THERAPIST

## 2024-09-11 ENCOUNTER — THERAPY VISIT (OUTPATIENT)
Dept: OCCUPATIONAL THERAPY | Facility: REHABILITATION | Age: 29
End: 2024-09-11
Attending: FAMILY MEDICINE
Payer: COMMERCIAL

## 2024-09-11 DIAGNOSIS — M25.522 PAIN OF BOTH ELBOWS: Primary | ICD-10-CM

## 2024-09-11 DIAGNOSIS — M25.521 PAIN OF BOTH ELBOWS: Primary | ICD-10-CM

## 2024-09-11 PROCEDURE — 97110 THERAPEUTIC EXERCISES: CPT | Mod: GO | Performed by: OCCUPATIONAL THERAPIST

## 2024-09-16 ENCOUNTER — THERAPY VISIT (OUTPATIENT)
Dept: PHYSICAL THERAPY | Facility: REHABILITATION | Age: 29
End: 2024-09-16
Payer: COMMERCIAL

## 2024-09-16 ENCOUNTER — THERAPY VISIT (OUTPATIENT)
Dept: OCCUPATIONAL THERAPY | Facility: REHABILITATION | Age: 29
End: 2024-09-16
Attending: FAMILY MEDICINE
Payer: COMMERCIAL

## 2024-09-16 DIAGNOSIS — M25.522 PAIN OF BOTH ELBOWS: Primary | ICD-10-CM

## 2024-09-16 DIAGNOSIS — S86.899A MEDIAL TIBIAL STRESS SYNDROME, UNSPECIFIED LATERALITY, INITIAL ENCOUNTER: Primary | ICD-10-CM

## 2024-09-16 DIAGNOSIS — G89.29 CHRONIC PAIN OF BOTH KNEES: ICD-10-CM

## 2024-09-16 DIAGNOSIS — M25.562 CHRONIC PAIN OF BOTH KNEES: ICD-10-CM

## 2024-09-16 DIAGNOSIS — M25.521 PAIN OF BOTH ELBOWS: Primary | ICD-10-CM

## 2024-09-16 DIAGNOSIS — M25.561 CHRONIC PAIN OF BOTH KNEES: ICD-10-CM

## 2024-09-16 PROCEDURE — 97110 THERAPEUTIC EXERCISES: CPT | Mod: GP | Performed by: PHYSICAL THERAPIST

## 2024-09-16 PROCEDURE — 97110 THERAPEUTIC EXERCISES: CPT | Mod: GO | Performed by: OCCUPATIONAL THERAPIST

## 2024-09-16 PROCEDURE — 97530 THERAPEUTIC ACTIVITIES: CPT | Mod: GO | Performed by: OCCUPATIONAL THERAPIST

## 2024-09-16 ASSESSMENT — ACTIVITIES OF DAILY LIVING (ADL)
GIVING WAY, BUCKLING OR SHIFTING OF KNEE: I DO NOT HAVE THE SYMPTOM
KNEEL ON THE FRONT OF YOUR KNEE: ACTIVITY IS FAIRLY DIFFICULT
STIFFNESS: I HAVE THE SYMPTOM BUT IT DOES NOT AFFECT MY ACTIVITY
PAIN: THE SYMPTOM AFFECTS MY ACTIVITY SLIGHTLY
GO DOWN STAIRS: ACTIVITY IS SOMEWHAT DIFFICULT
KNEE_ACTIVITY_OF_DAILY_LIVING_SCORE: 80
WALK: ACTIVITY IS MINIMALLY DIFFICULT
SQUAT: ACTIVITY IS SOMEWHAT DIFFICULT
RISE FROM A CHAIR: ACTIVITY IS NOT DIFFICULT
SWELLING: I DO NOT HAVE THE SYMPTOM
GO UP STAIRS: ACTIVITY IS MINIMALLY DIFFICULT
SIT WITH YOUR KNEE BENT: ACTIVITY IS NOT DIFFICULT
STAND: ACTIVITY IS SOMEWHAT DIFFICULT
RAW_SCORE: 56
WEAKNESS: I DO NOT HAVE THE SYMPTOM
KNEE_ACTIVITY_OF_DAILY_LIVING_SUM: 56
HOW_WOULD_YOU_RATE_THE_OVERALL_FUNCTION_OF_YOUR_KNEE_DURING_YOUR_USUAL_DAILY_ACTIVITIES?: NEARLY NORMAL
LIMPING: I DO NOT HAVE THE SYMPTOM
HOW_WOULD_YOU_RATE_THE_CURRENT_FUNCTION_OF_YOUR_KNEE_DURING_YOUR_USUAL_DAILY_ACTIVITIES_ON_A_SCALE_FROM_0_TO_100_WITH_100_BEING_YOUR_LEVEL_OF_KNEE_FUNCTION_PRIOR_TO_YOUR_INJURY_AND_0_BEING_THE_INABILITY_TO_PERFORM_ANY_OF_YOUR_USUAL_DAILY_ACTIVITIES?: 75
AS_A_RESULT_OF_YOUR_KNEE_INJURY,_HOW_WOULD_YOU_RATE_YOUR_CURRENT_LEVEL_OF_DAILY_ACTIVITY?: ABNORMAL

## 2024-09-20 ENCOUNTER — MYC MEDICAL ADVICE (OUTPATIENT)
Dept: FAMILY MEDICINE | Facility: CLINIC | Age: 29
End: 2024-09-20
Payer: COMMERCIAL

## 2024-09-20 NOTE — TELEPHONE ENCOUNTER
Forms/Letter Request    Type of form/letter: OTHER: ADA eligibility form     Do we have the form/letter: Yes: via [employer]    Who is the form from? Patient    Where did/will the form come from? form was sent via [employer]    When is form/letter needed by: ASAP    How would you like the form/letter returned: [employer]    Patient Notified form requests are processed in 5-7 business days:Yes    Could we send this information to you in [employer] or would you prefer to receive a phone call?:   Patient would like to be contacted via [employer]

## 2024-09-30 ENCOUNTER — THERAPY VISIT (OUTPATIENT)
Dept: OCCUPATIONAL THERAPY | Facility: REHABILITATION | Age: 29
End: 2024-09-30
Payer: COMMERCIAL

## 2024-09-30 DIAGNOSIS — M25.522 PAIN OF BOTH ELBOWS: Primary | ICD-10-CM

## 2024-09-30 DIAGNOSIS — M25.521 PAIN OF BOTH ELBOWS: Primary | ICD-10-CM

## 2024-09-30 PROCEDURE — 97530 THERAPEUTIC ACTIVITIES: CPT | Mod: GO | Performed by: OCCUPATIONAL THERAPIST

## 2024-09-30 PROCEDURE — 97110 THERAPEUTIC EXERCISES: CPT | Mod: GO | Performed by: OCCUPATIONAL THERAPIST

## 2024-09-30 NOTE — PROGRESS NOTES
09/30/24 0500   Appointment Info   Treating Provider MANJINDER Guillory, OTR/L, CHT   Total/Authorized Visits 8 (POC)   Visits Used 4   Medical Diagnosis Bilateral elbow pain   OT Tx Diagnosis Bilateral elbow pain   Precautions/Limitations Discharge   Progress Note/Certification   Onset of Illness/Injury or Date of Surgery 07/17/24  (Referral)   Therapy Frequency 1 time per week   Predicted Duration 8 weeks   Progress Note Due Date 10/30/24   Progress Note Completed Date 09/04/24   Goals   OT Goals 1   OT Goal 1   Goal Identifier Goal I   Goal Description Patient will complete required ADL, IADL, work and leisure tasks with little to no pain and/or difficulty utilizing AE, soft tissue/nerve protection principles and orthotics as needed.  (0-2/10)   Rationale In order to maximize safety and independence with performance of self-care activities;In order to maximize safety and independence with ADL/IADLs   Goal Progress Achieved   Target Date 10/30/24   Date Met 09/30/24   Subjective Report   Subjective Report Therapy has really helped my whole body.  My elbow is still a little sore if I sit and keyboard for too long, but not nearly as bad as before.  I am seeing the doctor later in the week to look into getting a new office chair.   Objective Measures   Objective Measures Objective Measure 3;Objective Measure 4;Objective Measure 1;Objective Measure 2   Objective Measure 2   Objective Measure 1/10 to the elbows with prolonged keyboarding   Details Pain with activity   Treatment Interventions (OT)   Interventions Therapeutic Procedure/Exercise;Therapeutic Activity;Neuromuscular Re-education   Therapeutic Procedure/Exercise   Therapeutic Procedure: strength, endurance, ROM, flexibillity minutes (00509) 10   Ther Proc 1 Reviewed the patient's HEP in its entirety, recommend ongoing stretching on a daily basis, with frequency according to symptom control.  Strengthening and core stabilization exercises may reduce to 3  to 4 days/week in accordance with ACSM guidelines.   PTRx Ther Proc 1 Healthy Computer Use   PTRx Ther Proc 1 - Details HEP   PTRx Ther Proc 2 Finger Active Range of Motion Tendon Glides Fist Series   PTRx Ther Proc 2 - Details HEP   PTRx Ther Proc 3 Forearm Passive Range of Motion Extensor Stretch   PTRx Ther Proc 3 - Details HEP   PTRx Ther Proc 4 Forearm Passive Range of Motion Advanced Extensor Stretch   PTRx Ther Proc 4 - Details HEP   PTRx Ther Proc 5 Forearm Passive Range of Motion Flexor Stretch   PTRx Ther Proc 5 - Details HEP   PTRx Ther Proc 6 Forearm PROM Advanced Flexor Stretch   PTRx Ther Proc 6 - Details HEP   PTRx Ther Proc 7 Pectoral Stretch on Wall   PTRx Ther Proc 7 - Details HEP   PTRx Ther Proc 8 Latissimus Dorsi Stretch Seated   PTRx Ther Proc 8 - Details HEP   PTRx Ther Proc 9 Cervical Retraction With Patient Overpressure   PTRx Ther Proc 9 - Details Active correction while at work.    PTRx Ther Proc 10 Shoulder Theraband Rows   PTRx Ther Proc 10 - Details HEP   PTRx Ther Proc 11 Shoulder Horizontal Abduction/Diagonals With Theraband   PTRx Ther Proc 11 - Details HEP   PTRx Ther Proc 12 Elbow Strengthening Flexion with Theraband   Therapeutic Exercise Notes 12 HEP   PTRx Ther Proc 13 Elbow Strengthening Extension with Theraband   PTRx Ther Proc 13 - Details HEP   Skilled Intervention Tolerated well, patient demonstrated and verbalized understanding.   Patient Response/Progress For improved soft tissue extensibility, tendon and nerve excursion mitigating pain and paresthesia with ADL, IADL, work, leisure and sleep.   Therapeutic Activity   Therapeutic Activity Minutes (60452) 17   Ther Act 1 Collaborated with patient regarding additional ergonomic modifications and ongoing use of adaptive equipment.  She may continue with her wrist orthotics at nighttime as needed for symptom control.  Discussed with her further modifications to desk height and seat back positioning in order to reduce sustained  extension of the elbow.  She did leave with some new corrections, however do recommend pursuing a more adjustable chair through her primary care provider.   PTRx Ther Act 1 Computer Ergonomics   PTRx Ther Act 1 - Details HEP   PTRx Ther Act 2 Sitting Posture at Computer   PTRx Ther Act 2 - Details HEP   PTRx Ther Act 3 Orthosis Wear and Care   PTRx Ther Act 3 - Details Recommend prefabricated, 3M forearm-based P1 blocking orthosis to the left at nighttime only.  Educated on orthotic purpose, fit, wear and care with written handout provided for self purchase.   PTRx Ther Act 4 Carpal Tunnel Syndrome Prevention   PTRx Ther Act 4 - Details HEP   PTRx Ther Act 5 Cubital Tunnel Prevention   PTRx Ther Act 5 - Details HEP   Skilled Intervention For soft tissue, joint, and nerve protection mitigating pain and paresthesia with ADL, IADL, work, leisure and sleep.   Patient Response/Progress Tolerated well, patient demonstrated and verbalized understanding.   Education   Learner/Method Patient;No Barriers to Learning;Pictures/Video;Demonstration;Reading;Listening   Plan   Home program Per PT Rx, implement ergonomic considerations and soft tissue/nerve protection principles as described above were able during the day.  Prefabricated wrist brace to the LUE at nighttime only.   Updates to plan of care DC         DISCHARGE  Reason for Discharge: Patient has met all goals.  Patient chooses to discontinue therapy.    Discharge Plan: Patient to continue home program.    Referring Provider:  Melanie Moser

## 2024-10-04 ENCOUNTER — VIRTUAL VISIT (OUTPATIENT)
Dept: FAMILY MEDICINE | Facility: CLINIC | Age: 29
End: 2024-10-04
Payer: COMMERCIAL

## 2024-10-04 DIAGNOSIS — M25.562 CHRONIC PAIN OF BOTH KNEES: ICD-10-CM

## 2024-10-04 DIAGNOSIS — M25.522 BILATERAL ELBOW JOINT PAIN: Primary | ICD-10-CM

## 2024-10-04 DIAGNOSIS — M25.521 BILATERAL ELBOW JOINT PAIN: Primary | ICD-10-CM

## 2024-10-04 DIAGNOSIS — G89.29 CHRONIC PAIN OF BOTH KNEES: ICD-10-CM

## 2024-10-04 DIAGNOSIS — S86.899A MEDIAL TIBIAL STRESS SYNDROME, UNSPECIFIED LATERALITY, INITIAL ENCOUNTER: ICD-10-CM

## 2024-10-04 DIAGNOSIS — M25.561 CHRONIC PAIN OF BOTH KNEES: ICD-10-CM

## 2024-10-04 PROCEDURE — 99442 PR PHYSICIAN TELEPHONE EVALUATION 11-20 MIN: CPT | Mod: 93 | Performed by: FAMILY MEDICINE

## 2024-10-04 NOTE — PROGRESS NOTES
Lizbeth is a 29 year old who is being evaluated via a billable telephone visit.    What phone number would you like to be contacted at? [phone]   How would you like to obtain your AVS? [place]  Originating Location (pt. Location): Home    Distant Location (provider location):  On-site    Assessment & Plan     Bilateral elbow joint pain      Medial tibial stress syndrome, unspecified laterality, initial encounter      Chronic pain of both knees    Above causing disability and all interfering with her work activities.    Ergonomic assessment with needed change in chair, desk, keyboard, foot stool mouse etc as recommended.      Paperwork completed as requested.         Subjective   Lizbeth is a 29 year old, presenting for the following health issues:  Forms (From [employer])        10/4/2024     4:39 PM   Additional Questions   Roomed by M   Accompanied by SELF         10/4/2024   Forms   Any forms needing to be completed Yes        History of Present Illness       Reason for visit:  FORM    She eats 4 or more servings of fruits and vegetables daily.She consumes 1 sweetened beverage(s) daily.She exercises with enough effort to increase her heart rate 20 to 29 minutes per day.  She exercises with enough effort to increase her heart rate 5 days per week.   She is taking medications regularly.     Form submitted for ada accomodations    Doing pt and ot for her knee pain/leg pain and elbow pain. Has noted that if she is at work sitting and typing for a while- 15minutes she starts to get more pain and needs to move around and stretch.  Working from home at computer with video calls.      Hoping to get an ergonmic assessment and changes to her home office including possible chair, foot rest, mouse, keyboards, standing desks, desk adjustments.      Bathing causes pain in elbows, struggles with bending, concentrating due to pain at times, eating and food prep hard due to elbow pain, interacting with others at work hard at times  due pain distracting her, sometimes hard to learn due to poor concentration, lifting, manual tasks, reaching difficult.  Hard to read if she has to hold a book due to elbow pain.  Sitting or standing for periods without movement causes increased leg pain.  Walking for periods of time painful.      Symptoms started feb 2024.  Getting better with pt/ot but work activities still aggravating her symptoms           Objective    Vitals - Patient Reported  Weight (Patient Reported): 77.1 kg (170 lb)        Physical Exam   General: Alert and no distress //Respiratory: No audible wheeze, cough, or shortness of breath // Psychiatric:  Appropriate affect, tone, and pace of words            Phone call duration: 15 minutes  Signed Electronically by: Melanie Moser MD

## 2025-04-24 ENCOUNTER — TELEPHONE (OUTPATIENT)
Dept: FAMILY MEDICINE | Facility: CLINIC | Age: 30
End: 2025-04-24
Payer: COMMERCIAL

## 2025-04-24 ENCOUNTER — MYC MEDICAL ADVICE (OUTPATIENT)
Dept: FAMILY MEDICINE | Facility: CLINIC | Age: 30
End: 2025-04-24
Payer: COMMERCIAL

## 2025-04-24 NOTE — TELEPHONE ENCOUNTER
Will need to be seen- virtual visit okay  Last visit to review anxiety was 7/2024 and she was doing well with plan to wean off meds and follow-up with mental health therapy.      Okay to use open spot or db as needed

## 2025-04-24 NOTE — TELEPHONE ENCOUNTER
LMTCB to schedule an appointment. If patient calls back please help patient schedule an appointment. Okay per provider to DB or use any approval slot. Will call back tomorrow.

## 2025-04-24 NOTE — TELEPHONE ENCOUNTER
Future Appointments 4/24/2025 - 10/21/2025        Date Visit Type Length Department Provider     4/28/2025  9:00 AM OFFICE VISIT 20 min [facility] Melanie Moser MD    Location Instructions:     [facility] is located at [address] [place] [address] in [place], at the intersection of [facility]. This is one block south of the [facility] [employer] [facility] [facility]. Free parking is available in the lot directly north of the clinic across [facility]. The clinic is near stops along bus [address].              8/18/2025  1:20 PM PREVENTATIVE ADULT 40 min [facility] Melanie Moser MD    Location Instructions:     [facility] is located at [address] [place] [address] in [place], at the intersection of [facility]. This is one block south of the [facility] [employer] [facility] [facility]. Free parking is available in the lot directly north of the clinic across [facility]. The clinic is near stops along bus [address].

## 2025-04-24 NOTE — TELEPHONE ENCOUNTER
Forms/Letter Request    Type of form/letter: OTHER: WORK ACCOMODATION FORMS        Do we have the form/letter: Yes: WILL UPLOAD FORM TO [employer]    Who is the form from? Patient    Where did/will the form come from? form was sent via [email or web address]    When is form/letter needed by: NO DATE SPECIFIED    How would you like the form/letter returned: [email or web address]    Patient Notified form requests are processed in 5-7 business days:N/A    Could we send this information to you in [email or web address] or would you prefer to receive a phone call?:   Patient would prefer a phone call   Okay to leave a detailed message?: Yes at Home number on file [phone] (home)

## 2025-04-25 ASSESSMENT — ANXIETY QUESTIONNAIRES
IF YOU CHECKED OFF ANY PROBLEMS ON THIS QUESTIONNAIRE, HOW DIFFICULT HAVE THESE PROBLEMS MADE IT FOR YOU TO DO YOUR WORK, TAKE CARE OF THINGS AT HOME, OR GET ALONG WITH OTHER PEOPLE: EXTREMELY DIFFICULT
7. FEELING AFRAID AS IF SOMETHING AWFUL MIGHT HAPPEN: MORE THAN HALF THE DAYS
3. WORRYING TOO MUCH ABOUT DIFFERENT THINGS: NEARLY EVERY DAY
4. TROUBLE RELAXING: NEARLY EVERY DAY
6. BECOMING EASILY ANNOYED OR IRRITABLE: NEARLY EVERY DAY
5. BEING SO RESTLESS THAT IT IS HARD TO SIT STILL: NEARLY EVERY DAY
2. NOT BEING ABLE TO STOP OR CONTROL WORRYING: NEARLY EVERY DAY
GAD7 TOTAL SCORE: 20
1. FEELING NERVOUS, ANXIOUS, OR ON EDGE: NEARLY EVERY DAY

## 2025-04-30 ENCOUNTER — VIRTUAL VISIT (OUTPATIENT)
Dept: FAMILY MEDICINE | Facility: CLINIC | Age: 30
End: 2025-04-30
Payer: COMMERCIAL

## 2025-04-30 DIAGNOSIS — F32.81 PMDD (PREMENSTRUAL DYSPHORIC DISORDER): ICD-10-CM

## 2025-04-30 DIAGNOSIS — F41.1 GENERALIZED ANXIETY DISORDER: Primary | ICD-10-CM

## 2025-04-30 DIAGNOSIS — F33.1 MODERATE RECURRENT MAJOR DEPRESSION (H): ICD-10-CM

## 2025-04-30 PROCEDURE — 96127 BRIEF EMOTIONAL/BEHAV ASSMT: CPT | Mod: 95 | Performed by: FAMILY MEDICINE

## 2025-04-30 PROCEDURE — 98006 SYNCH AUDIO-VIDEO EST MOD 30: CPT | Performed by: FAMILY MEDICINE

## 2025-04-30 RX ORDER — SERTRALINE HYDROCHLORIDE 100 MG/1
100 TABLET, FILM COATED ORAL DAILY
Qty: 90 TABLET | Refills: 1 | Status: SHIPPED | OUTPATIENT
Start: 2025-04-30

## 2025-04-30 ASSESSMENT — ANXIETY QUESTIONNAIRES
8. IF YOU CHECKED OFF ANY PROBLEMS, HOW DIFFICULT HAVE THESE MADE IT FOR YOU TO DO YOUR WORK, TAKE CARE OF THINGS AT HOME, OR GET ALONG WITH OTHER PEOPLE?: EXTREMELY DIFFICULT
7. FEELING AFRAID AS IF SOMETHING AWFUL MIGHT HAPPEN: MORE THAN HALF THE DAYS
GAD7 TOTAL SCORE: 20
7. FEELING AFRAID AS IF SOMETHING AWFUL MIGHT HAPPEN: MORE THAN HALF THE DAYS
IF YOU CHECKED OFF ANY PROBLEMS ON THIS QUESTIONNAIRE, HOW DIFFICULT HAVE THESE PROBLEMS MADE IT FOR YOU TO DO YOUR WORK, TAKE CARE OF THINGS AT HOME, OR GET ALONG WITH OTHER PEOPLE: EXTREMELY DIFFICULT
6. BECOMING EASILY ANNOYED OR IRRITABLE: NEARLY EVERY DAY
3. WORRYING TOO MUCH ABOUT DIFFERENT THINGS: NEARLY EVERY DAY
4. TROUBLE RELAXING: NEARLY EVERY DAY
1. FEELING NERVOUS, ANXIOUS, OR ON EDGE: NEARLY EVERY DAY
5. BEING SO RESTLESS THAT IT IS HARD TO SIT STILL: NEARLY EVERY DAY
GAD7 TOTAL SCORE: 20
GAD7 TOTAL SCORE: 20
2. NOT BEING ABLE TO STOP OR CONTROL WORRYING: NEARLY EVERY DAY

## 2025-04-30 ASSESSMENT — PATIENT HEALTH QUESTIONNAIRE - PHQ9
SUM OF ALL RESPONSES TO PHQ QUESTIONS 1-9: 19
10. IF YOU CHECKED OFF ANY PROBLEMS, HOW DIFFICULT HAVE THESE PROBLEMS MADE IT FOR YOU TO DO YOUR WORK, TAKE CARE OF THINGS AT HOME, OR GET ALONG WITH OTHER PEOPLE: EXTREMELY DIFFICULT
SUM OF ALL RESPONSES TO PHQ QUESTIONS 1-9: 19

## 2025-04-30 NOTE — LETTER
My Depression Action Plan  Name: Lizbeth Ramos   Date of Birth 1995  Date: 4/30/2025    My doctor: Melanie Moser   My clinic: [facility] [address] [place] [id number] [phone]            GREEN    ZONE   Good Control    What it looks like:   Things are going generally well. You have normal ups and downs. You may even feel depressed from time to time, but bad moods usually last less than a day.   What you need to do:  Continue to care for yourself (see self care plan)  Check your depression survival kit and update it as needed  Follow your physician s recommendations including any medication.  Do not stop taking medication unless you consult with your physician first.             YELLOW         ZONE Getting Worse    What it looks like:   Depression is starting to interfere with your life.   It may be hard to get out of bed; you may be starting to isolate yourself from others.  Symptoms of depression are starting to last most all day and this has happened for several days.   You may have suicidal thoughts but they are not constant.   What you need to do:     Call your care team. Your response to treatment will improve if you keep your care team informed of your progress. Yellow periods are signs an adjustment may need to be made.     Continue your self-care.  Just get dressed and ready for the day.  Don't give yourself time to talk yourself out of it.    Talk to someone in your support network.    Open up your Depression Self-Care Plan/Wellness Kit.             RED    ZONE Medical Alert - Get Help    What it looks like:   Depression is seriously interfering with your life.   You may experience these or other symptoms: You can t get out of bed most days, can t work or engage in other necessary activities, you have trouble taking care of basic hygiene, or basic responsibilities, thoughts of suicide or death that will not go away, self-injurious behavior.      What you need to do:  Call your care team and request a same-day appointment. If they are not available (weekends or after hours) call your local crisis line, emergency room or 911.          Depression Self-Care Plan / Wellness Kit    Many people find that medication and therapy are helpful treatments for managing depression. In addition, making small changes to your everyday life can help to boost your mood and improve your wellbeing. Below are some tips for you to consider. Be sure to talk with your medical provider and/or behavioral health consultant if your symptoms are worsening or not improving.     Sleep   Sleep hygiene  means all of the habits that support good, restful sleep. It includes maintaining a consistent bedtime and wake time, using your bedroom only for sleeping or sex, and keeping the bedroom dark and free of distractions like a computer, smartphone, or television.     Develop a Healthy Routine  Maintain good hygiene. Get out of bed in the morning, make your bed, brush your teeth, take a shower, and get dressed. Don t spend too much time viewing media that makes you feel stressed. Find time to relax each day.    Exercise  Get some form of exercise every day. This will help reduce pain and release endorphins, the  feel good  chemicals in your brain. It can be as simple as just going for a walk or doing some gardening, anything that will get you moving.      Diet  Strive to eat healthy foods, including fruits and vegetables. Drink plenty of water. Avoid excessive sugar, caffeine, alcohol, and other mood-altering substances.     Stay Connected with Others  Stay in touch with friends and family members.    Manage Your Mood  Try deep breathing, massage therapy, biofeedback, or meditation. Take part in fun activities when you can. Try to find something to smile about each day.     Psychotherapy  Be open to working with a therapist if your provider recommends it.     Medication  Be sure to take your  medication as prescribed. Most anti-depressants need to be taken every day. It usually takes several weeks for medications to work. Not all medicines work for all people. It is important to follow-up with your provider to make sure you have a treatment plan that is working for you. Do not stop your medication abruptly without first discussing it with your provider.    Crisis Resources   These hotlines are for both adults and children. They and are open 24 hours a day, 7 days a week unless noted otherwise.    National Suicide Prevention Lifeline   988 or 1-973-865-IUMK (0846)    Crisis Text Line    www.crisistextline.org  Text HOME to 565780 from anywhere in the [place], anytime, about any type of crisis. A live, trained crisis counselor will receive the text and respond quickly.    [place] Lifeline for LGBTQ Youth  A national crisis intervention and suicide lifeline for LGBTQ youth under 25. Provides a safe place to talk without judgement. Call 1-997.615.4887; text START to 542883 or visit www.theShareTrackervorproject.org to talk to a trained counselor.    For [facility] crisis numbers, visit the [facility] website at:  https://[place].gov/dhs/people-we-serve/adults/health-care/mental-health/resources/crisis-contacts.jsp

## 2025-04-30 NOTE — PROGRESS NOTES
"Lizbeth is a 30 year old who is being evaluated via a billable video visit.    How would you like to obtain your AVS? MyChart  If the video visit is dropped, the invitation should be resent by: Text to cell phone: [phone]  Will anyone else be joining your video visit? No  {If patient encounters technical issues they should call [phone] :[id number]}    Assessment & Plan     Generalized anxiety disorder  ***  - sertraline (ZOLOFT) 100 MG tablet  Dispense: 90 tablet; Refill: 1    Moderate recurrent major depression (H)  ***    PMDD (premenstrual dysphoric disorder)  ***      BMI  Estimated body mass index is 28.49 kg/m  as calculated from the following:    Height as of 7/17/24: 1.65 m (5' 4.96\").    Weight as of 7/17/24: 77.6 kg (171 lb).   Weight management plan: Discussed healthy diet and exercise guidelines    Depression Screening Follow Up        4/30/2025    10:10 AM   PHQ   PHQ-9 Total Score 19    Q9: Thoughts of better off dead/self-harm past 2 weeks Not at all       Patient-reported         7/17/2024     1:56 PM 4/25/2025    11:50 AM 4/30/2025    10:10 AM   MALIK-7 SCORE   Total Score  20 (severe anxiety) 20 (severe anxiety)   Total Score 18 20  20        Patient-reported             Follow Up Actions Taken  Crisis resource information provided in After Visit Summary  Depression Action Plan reviewed with patient.  Referred patient back to current mental health provider.  Adjusted patient's anti-depressant medication.       Follow-up       Subjective   Lizbeth is a 30 year old, presenting for the following health issues:  Forms        4/30/2025    10:01 AM   Additional Questions   Roomed by Radha TOBIAS Pt notes that she is really struggling with her mental health health regarding work stress.  Starting in Feb her seasonal issues cause her to feel more blue and noted things were starting to get harder.  March got worse at work- announced mandatory return to work and no longer work from home.  This month even " worse- really sensitive to sounds, smells  and other people, struggling to stay focus, some word finding difficulty.  Increased anxiety.  Needing more unscheduled breaks to take walk or move to a different space.  Panic attack last week.  Working form home is very helpful but work has required in person work starting this spring. Harder to fall asleep and harder to fall asleep when she wakes up in the middle of the night.  Eating more to help with stress.  Short temper and agitated, overwhelmed.  Feels similar to her postpartum depression.  Has therapist and did increase to weekly visits.  Sertraline 50mg daily.       Notes that the 2 weeks prior to her period her symptoms get worse.          4/30/2025   Forms   Any forms needing to be completed Yes     History of Present Illness       Mental Health Follow-up:  Patient presents to follow-up on Depression & Anxiety.Patient's depression since last visit has been:  Worse  The patient is having other symptoms associated with depression.  Patient's anxiety since last visit has been:  Worse  The patient is having other symptoms associated with anxiety.  Any significant life events: job concerns  Patient is feeling anxious or having panic attacks.  Patient has no concerns about alcohol or drug use.    She eats 2-3 servings of fruits and vegetables daily.She consumes 1 sweetened beverage(s) daily.She exercises with enough effort to increase her heart rate 9 or less minutes per day.  She exercises with enough effort to increase her heart rate 3 or less days per week.   She is taking medications regularly.              Objective    Vitals - Patient Reported  Weight (Patient Reported): 78 kg (172 lb)        Physical Exam   GENERAL: alert and no distress  EYES: Eyes grossly normal to inspection.  No discharge or erythema, or obvious scleral/conjunctival abnormalities.  RESP: No audible wheeze, cough, or visible cyanosis.    SKIN: Visible skin clear. No significant rash,  abnormal pigmentation or lesions.  NEURO: Cranial nerves grossly intact.  Mentation and speech appropriate for age.  PSYCH: Appropriate affect, tone, and pace of words        Video-Visit Details    Type of service:  Video Visit   Originating Location (pt. Location): Home  {PROVIDER LOCATION On-site should be selected for visits conducted from your clinic location or adjoining [facility] hospital, academic office, or other nearby [facility] building. Off-site should be selected for all other provider locations, including home:[id number]}  Distant Location (provider location):  On-site  Platform used for Video Visit: [employer]kis  Signed Electronically by: Melanie Moser MD  {Email feedback regarding this note to [email or web address]   :[id number]}

## 2025-04-30 NOTE — PATIENT INSTRUCTIONS
Increase sertraline to 100mg daily  [facility]     See Dr Moser 8/2025 as scheduled    Let me know if you are having any side effects with higher dose asap.      For the hormones affecting your mood-   Consider hormone regulation  Consider bumping up sertraline 1-2 weeks before your period  Self cares- healthy eating

## 2025-08-14 ENCOUNTER — TELEPHONE (OUTPATIENT)
Dept: FAMILY MEDICINE | Facility: CLINIC | Age: 30
End: 2025-08-14
Payer: COMMERCIAL

## 2025-08-18 ENCOUNTER — OFFICE VISIT (OUTPATIENT)
Dept: FAMILY MEDICINE | Facility: CLINIC | Age: 30
End: 2025-08-18
Payer: COMMERCIAL

## 2025-08-18 VITALS
DIASTOLIC BLOOD PRESSURE: 64 MMHG | OXYGEN SATURATION: 97 % | BODY MASS INDEX: 28.51 KG/M2 | RESPIRATION RATE: 18 BRPM | TEMPERATURE: 97.7 F | WEIGHT: 167 LBS | HEART RATE: 66 BPM | SYSTOLIC BLOOD PRESSURE: 95 MMHG | HEIGHT: 64 IN

## 2025-08-18 DIAGNOSIS — F32.81 PMDD (PREMENSTRUAL DYSPHORIC DISORDER): ICD-10-CM

## 2025-08-18 DIAGNOSIS — F41.1 GENERALIZED ANXIETY DISORDER: ICD-10-CM

## 2025-08-18 DIAGNOSIS — Z00.00 ROUTINE GENERAL MEDICAL EXAMINATION AT A HEALTH CARE FACILITY: Primary | ICD-10-CM

## 2025-08-18 DIAGNOSIS — L65.9 HAIR LOSS: ICD-10-CM

## 2025-08-18 DIAGNOSIS — F33.1 MODERATE RECURRENT MAJOR DEPRESSION (H): ICD-10-CM

## 2025-08-18 LAB
ERYTHROCYTE [DISTWIDTH] IN BLOOD BY AUTOMATED COUNT: 11.7 % (ref 10–15)
HCT VFR BLD AUTO: 41.2 % (ref 35–47)
HGB BLD-MCNC: 13.5 G/DL (ref 11.7–15.7)
MCH RBC QN AUTO: 30.2 PG (ref 26.5–33)
MCHC RBC AUTO-ENTMCNC: 32.8 G/DL (ref 31.5–36.5)
MCV RBC AUTO: 92.2 FL (ref 78–100)
PLATELET # BLD AUTO: 201 10E3/UL (ref 150–450)
RBC # BLD AUTO: 4.47 10E6/UL (ref 3.8–5.2)
WBC # BLD AUTO: 7.59 10E3/UL (ref 4–11)

## 2025-08-18 PROCEDURE — 99395 PREV VISIT EST AGE 18-39: CPT | Mod: 25 | Performed by: FAMILY MEDICINE

## 2025-08-18 PROCEDURE — 84443 ASSAY THYROID STIM HORMONE: CPT | Performed by: FAMILY MEDICINE

## 2025-08-18 PROCEDURE — 85027 COMPLETE CBC AUTOMATED: CPT | Performed by: FAMILY MEDICINE

## 2025-08-18 PROCEDURE — 82728 ASSAY OF FERRITIN: CPT | Performed by: FAMILY MEDICINE

## 2025-08-18 PROCEDURE — 36415 COLL VENOUS BLD VENIPUNCTURE: CPT | Performed by: FAMILY MEDICINE

## 2025-08-18 PROCEDURE — 84403 ASSAY OF TOTAL TESTOSTERONE: CPT | Performed by: FAMILY MEDICINE

## 2025-08-18 PROCEDURE — 99214 OFFICE O/P EST MOD 30 MIN: CPT | Mod: 25 | Performed by: FAMILY MEDICINE

## 2025-08-18 PROCEDURE — 91320 SARSCV2 VAC 30MCG TRS-SUC IM: CPT | Performed by: FAMILY MEDICINE

## 2025-08-18 PROCEDURE — 90480 ADMN SARSCOV2 VAC 1/ONLY CMP: CPT | Performed by: FAMILY MEDICINE

## 2025-08-18 RX ORDER — SERTRALINE HYDROCHLORIDE 100 MG/1
TABLET, FILM COATED ORAL
Qty: 120 TABLET | Refills: 1 | Status: SHIPPED | OUTPATIENT
Start: 2025-08-18

## 2025-08-18 SDOH — HEALTH STABILITY: PHYSICAL HEALTH: ON AVERAGE, HOW MANY DAYS PER WEEK DO YOU ENGAGE IN MODERATE TO STRENUOUS EXERCISE (LIKE A BRISK WALK)?: 5 DAYS

## 2025-08-18 ASSESSMENT — SOCIAL DETERMINANTS OF HEALTH (SDOH): HOW OFTEN DO YOU GET TOGETHER WITH FRIENDS OR RELATIVES?: ONCE A WEEK

## 2025-08-18 ASSESSMENT — PATIENT HEALTH QUESTIONNAIRE - PHQ9
SUM OF ALL RESPONSES TO PHQ QUESTIONS 1-9: 14
10. IF YOU CHECKED OFF ANY PROBLEMS, HOW DIFFICULT HAVE THESE PROBLEMS MADE IT FOR YOU TO DO YOUR WORK, TAKE CARE OF THINGS AT HOME, OR GET ALONG WITH OTHER PEOPLE: SOMEWHAT DIFFICULT
SUM OF ALL RESPONSES TO PHQ QUESTIONS 1-9: 14

## 2025-08-19 LAB
FERRITIN SERPL-MCNC: 137 NG/ML (ref 6–175)
TSH SERPL DL<=0.005 MIU/L-ACNC: 1.42 UIU/ML (ref 0.3–4.2)

## 2025-08-20 LAB — TESTOST SERPL-MCNC: 22 NG/DL (ref 8–60)
